# Patient Record
Sex: FEMALE | Race: BLACK OR AFRICAN AMERICAN | Employment: OTHER | URBAN - METROPOLITAN AREA
[De-identification: names, ages, dates, MRNs, and addresses within clinical notes are randomized per-mention and may not be internally consistent; named-entity substitution may affect disease eponyms.]

---

## 2017-02-23 ENCOUNTER — HOSPITAL ENCOUNTER (OUTPATIENT)
Dept: MRI IMAGING | Age: 77
Discharge: HOME | End: 2017-02-23
Attending: ORTHOPAEDIC SURGERY
Payer: MEDICARE

## 2017-02-23 ENCOUNTER — HOSPITAL ENCOUNTER (OUTPATIENT)
Dept: GENERAL RADIOLOGY | Age: 77
Discharge: HOME | End: 2017-02-23
Attending: ORTHOPAEDIC SURGERY
Payer: MEDICARE

## 2017-02-23 DIAGNOSIS — E78.5 HYPERLIPEMIA: ICD-10-CM

## 2017-02-23 DIAGNOSIS — M25.562 LEFT KNEE PAIN: ICD-10-CM

## 2017-02-23 DIAGNOSIS — K21.9 ESOPHAGEAL REFLUX: ICD-10-CM

## 2017-02-23 DIAGNOSIS — M17.12 PRIMARY OSTEOARTHRITIS OF LEFT KNEE: ICD-10-CM

## 2017-02-23 DIAGNOSIS — Z86.718 PERSONAL HISTORY OF VENOUS THROMBOSIS AND EMBOLISM: ICD-10-CM

## 2017-02-23 DIAGNOSIS — E11.52 DIABETIC GANGRENE (HCC): ICD-10-CM

## 2017-02-23 PROCEDURE — 73560 X-RAY EXAM OF KNEE 1 OR 2: CPT

## 2017-02-23 PROCEDURE — 73721 MRI JNT OF LWR EXTRE W/O DYE: CPT

## 2017-02-23 PROCEDURE — 73501 X-RAY EXAM HIP UNI 1 VIEW: CPT

## 2017-02-23 PROCEDURE — 73600 X-RAY EXAM OF ANKLE: CPT

## 2017-03-14 ENCOUNTER — HOSPITAL ENCOUNTER (OUTPATIENT)
Dept: PREADMISSION TESTING | Age: 77
Discharge: HOME | End: 2017-03-14
Payer: MEDICARE

## 2017-03-14 LAB
ANION GAP BLD CALC-SCNC: 3 MMOL/L (ref 3–18)
APPEARANCE UR: CLEAR
ATRIAL RATE: 78 BPM
BACTERIA SPEC CULT: ABNORMAL
BACTERIA URNS QL MICRO: ABNORMAL /HPF
BILIRUB UR QL: NEGATIVE
BUN SERPL-MCNC: 9 MG/DL (ref 7–18)
BUN/CREAT SERPL: 12 (ref 12–20)
CALCIUM SERPL-MCNC: 8.9 MG/DL (ref 8.5–10.1)
CALCULATED P AXIS, ECG09: 12 DEGREES
CALCULATED R AXIS, ECG10: 37 DEGREES
CALCULATED T AXIS, ECG11: 38 DEGREES
CHLORIDE SERPL-SCNC: 106 MMOL/L (ref 100–108)
CO2 SERPL-SCNC: 32 MMOL/L (ref 21–32)
COLOR UR: YELLOW
CREAT SERPL-MCNC: 0.75 MG/DL (ref 0.6–1.3)
DIAGNOSIS, 93000: NORMAL
EPITH CASTS URNS QL MICRO: ABNORMAL /LPF (ref 0–5)
ERYTHROCYTE [DISTWIDTH] IN BLOOD BY AUTOMATED COUNT: 15.9 % (ref 11.6–14.5)
EST. AVERAGE GLUCOSE BLD GHB EST-MCNC: 137 MG/DL
GLUCOSE SERPL-MCNC: 108 MG/DL (ref 74–99)
GLUCOSE UR STRIP.AUTO-MCNC: NEGATIVE MG/DL
HBA1C MFR BLD: 6.4 % (ref 4.5–5.6)
HCT VFR BLD AUTO: 36 % (ref 35–45)
HGB BLD-MCNC: 11.4 G/DL (ref 12–16)
HGB UR QL STRIP: ABNORMAL
KETONES UR QL STRIP.AUTO: NEGATIVE MG/DL
LEUKOCYTE ESTERASE UR QL STRIP.AUTO: NEGATIVE
MCH RBC QN AUTO: 28.4 PG (ref 24–34)
MCHC RBC AUTO-ENTMCNC: 31.7 G/DL (ref 31–37)
MCV RBC AUTO: 89.6 FL (ref 74–97)
NITRITE UR QL STRIP.AUTO: NEGATIVE
P-R INTERVAL, ECG05: 126 MS
PH UR STRIP: 6 [PH] (ref 5–8)
PLATELET # BLD AUTO: 283 K/UL (ref 135–420)
PMV BLD AUTO: 9.1 FL (ref 9.2–11.8)
POTASSIUM SERPL-SCNC: 3.9 MMOL/L (ref 3.5–5.5)
PROT UR STRIP-MCNC: NEGATIVE MG/DL
Q-T INTERVAL, ECG07: 392 MS
QRS DURATION, ECG06: 82 MS
QTC CALCULATION (BEZET), ECG08: 446 MS
RBC # BLD AUTO: 4.02 M/UL (ref 4.2–5.3)
RBC #/AREA URNS HPF: ABNORMAL /HPF (ref 0–5)
SERVICE CMNT-IMP: ABNORMAL
SODIUM SERPL-SCNC: 141 MMOL/L (ref 136–145)
SP GR UR REFRACTOMETRY: 1.02 (ref 1–1.03)
UROBILINOGEN UR QL STRIP.AUTO: 0.2 EU/DL (ref 0.2–1)
VENTRICULAR RATE, ECG03: 78 BPM
WBC # BLD AUTO: 6.9 K/UL (ref 4.6–13.2)
WBC URNS QL MICRO: ABNORMAL /HPF (ref 0–5)

## 2017-03-14 PROCEDURE — 87086 URINE CULTURE/COLONY COUNT: CPT | Performed by: ORTHOPAEDIC SURGERY

## 2017-03-14 PROCEDURE — 36415 COLL VENOUS BLD VENIPUNCTURE: CPT | Performed by: ORTHOPAEDIC SURGERY

## 2017-03-14 PROCEDURE — 87641 MR-STAPH DNA AMP PROBE: CPT | Performed by: ORTHOPAEDIC SURGERY

## 2017-03-14 PROCEDURE — 81001 URINALYSIS AUTO W/SCOPE: CPT | Performed by: ORTHOPAEDIC SURGERY

## 2017-03-14 PROCEDURE — 85027 COMPLETE CBC AUTOMATED: CPT | Performed by: ORTHOPAEDIC SURGERY

## 2017-03-14 PROCEDURE — 80048 BASIC METABOLIC PNL TOTAL CA: CPT | Performed by: ORTHOPAEDIC SURGERY

## 2017-03-14 PROCEDURE — 93005 ELECTROCARDIOGRAM TRACING: CPT

## 2017-03-14 PROCEDURE — 83036 HEMOGLOBIN GLYCOSYLATED A1C: CPT | Performed by: ORTHOPAEDIC SURGERY

## 2017-03-15 PROBLEM — Z22.322 MRSA NASAL COLONIZATION: Status: ACTIVE | Noted: 2017-03-15

## 2017-03-16 LAB
BACTERIA SPEC CULT: NORMAL
SERVICE CMNT-IMP: NORMAL

## 2017-03-22 PROBLEM — M17.12 OSTEOARTHRITIS OF LEFT KNEE: Status: ACTIVE | Noted: 2017-03-22

## 2017-03-24 NOTE — H&P
Patient Name:  Darlin Wright  YOB: 1940      Chief Complaint:   Left knee osteoarthritis and left-sided hip pain. History of Chief Complaint:  Ms. Rebecca Vivar is a 78 y.o female being seen for left knee osteoarthritis and left-sided hip pain. She is planning on having the left knee replaced but is having difficulty with walking and standing because of left-sided buttock pain and hip pain. She has no numbness, no weakness, and no bowel or bladder dysfunction. Past Medical/Surgical History:    Disease/Disorder Type Date Side Surgery Date Side Comment   Phlebitis          Pneumonia          Gout          Fibromyalgia          Degenerative joint disease          Arthritis          Hyperlipidemia          Hypertension          Blood Clots              Hysterectomy 1971         Spinal fusion, lumbar 2000         Spinal fusion, lumbar 2005         Arthroscopy shoulder 2008 left        Knee surgery 2008 right        Knee surgery 2010 right        Arthroscopy knee 2011 right        Heel surgery 2012 left        Foot surgery 2012 right        Hip replacement 2013 right      Allergies:    Ingredient Reaction Medication Name Comment   CODEINE      PENICILLINS          Current Medications:    Medication Directions   Coumadin 5 mg Tab    hydrochlorothiazide 12.5 mg Cap    Citracal + D    Nexium 40 mg Cap    Lipitor 40 mg Tab    Fosamax Plus D 70 mg-5,600 unit Tab    Ditropan XL 10 mg 24 hr Tab    potassium chloride ER 20 mEq Tab, Particles/Crystals    Percocet      Social History:      ALCOHOL   consumed rarely.     Family History:    Disease Detail Family Member Age Cause of Death Comments   Family history of Cancer   N    Family history of Diabetes mellitus   N    Family history of Heart disease   N    Family history of Hypertension   N    Family history of Osteoarthritis   N    Arthritis Father  N    Hypertension Father  N    Stroke Father  N    Cancer, unknown Mother  N    Hypertension Mother  N Review of Systems:  A full review of systems was completed. Pertinent positives include headache, blood in urine, changes in mood, chronic cough, depression, frequent urination, indigestion, joint pain, joint stiffness, loss of balance and swelling of feet. Pertinent negatives include blurred vision, chest pain, chills, cold, discharge of the eyes, dizziness, double vision, fever, hearing loss, heart murmur, itching of the eyes, palpitations, redness of the eyes, rheumatic fever, ringing in ears, sore throat/hoarseness, weight change, abdominal pain, anxiety, bipolar disorder, bladder/kidney infection, bloody stool, burning sensation, diarrhea, difficulty breathing, difficulty swallowing, fainting, fracture/dislocation, gas/bloating, gout, hemorrhoids, incontinence, memory loss, muscle weakness, nausea/vomiting, numbness/tingling, pain on breathing, painful urination, psoriasis, rash/itching, Raynaud's phenomenon, rheumatoid disease, seizure disorder, shortness of breath, sprain/strain, tendonitis, varicose veins and wheezing. Physical Examination:    General:  The patient appears healthy. Cardiovascular:  Arterial pulses are normal.  Heart- RRR  Lungs-CTA karla  Abdomen- +BS,soft,nontender  Skin:  The skin is normal appearing with no contusions or wounds noted. Musculoskeletal:  There is tenderness of the left PSIS and left greater trochanter. The thoracolumbar spine has normal kyphosis, a normal appearance, and no scoliosis. There is full range of motion of the cervical, thoracic, and lumbar spine and of the hips, knees, and ankles. Straight leg raising and crossed straight leg raising tests are negative. Neurological:  There is no weakness in the thoracic, lumbar, or sacral spine or in the lower extremities or hips. Deep tendon reflexes are present and normal bilaterally. Ankle and knee jerks are normal with no clonus.         Radiograph Examination:  Review of her x-ray examination reveals severe medial osteoarthritis of the left knee. Impression:  Ms. Liliana Kelley has left-sided hip pain, bursitis, and tendinitis. She will follow up for her regularly scheduled surgical intervention for left total knee arthroplasty.

## 2017-04-03 ENCOUNTER — ANESTHESIA EVENT (OUTPATIENT)
Dept: SURGERY | Age: 77
DRG: 469 | End: 2017-04-03
Payer: MEDICARE

## 2017-04-04 ENCOUNTER — HOSPITAL ENCOUNTER (INPATIENT)
Age: 77
LOS: 4 days | Discharge: NURSING, REHAB OR LONG-TERM CARE | DRG: 469 | End: 2017-04-08
Attending: ORTHOPAEDIC SURGERY | Admitting: ORTHOPAEDIC SURGERY
Payer: MEDICARE

## 2017-04-04 ENCOUNTER — ANESTHESIA (OUTPATIENT)
Dept: SURGERY | Age: 77
DRG: 469 | End: 2017-04-04
Payer: MEDICARE

## 2017-04-04 ENCOUNTER — SURGERY (OUTPATIENT)
Age: 77
End: 2017-04-04

## 2017-04-04 PROBLEM — I25.10 CAD (CORONARY ARTERY DISEASE): Status: ACTIVE | Noted: 2017-04-04

## 2017-04-04 LAB
GLUCOSE BLD STRIP.AUTO-MCNC: 77 MG/DL (ref 70–110)
INR PPP: 1 (ref 0.8–1.2)
PROTHROMBIN TIME: 13.1 SEC (ref 11.5–15.2)

## 2017-04-04 PROCEDURE — L1830 KO IMMOB CANVAS LONG PRE OTS: HCPCS | Performed by: ORTHOPAEDIC SURGERY

## 2017-04-04 PROCEDURE — C1713 ANCHOR/SCREW BN/BN,TIS/BN: HCPCS | Performed by: ORTHOPAEDIC SURGERY

## 2017-04-04 PROCEDURE — 97530 THERAPEUTIC ACTIVITIES: CPT

## 2017-04-04 PROCEDURE — 85610 PROTHROMBIN TIME: CPT | Performed by: ORTHOPAEDIC SURGERY

## 2017-04-04 PROCEDURE — 77030018846 HC SOL IRR STRL H20 ICUM -A: Performed by: ORTHOPAEDIC SURGERY

## 2017-04-04 PROCEDURE — 74011250637 HC RX REV CODE- 250/637: Performed by: PHYSICIAN ASSISTANT

## 2017-04-04 PROCEDURE — 77030020782 HC GWN BAIR PAWS FLX 3M -B: Performed by: ORTHOPAEDIC SURGERY

## 2017-04-04 PROCEDURE — C1776 JOINT DEVICE (IMPLANTABLE): HCPCS | Performed by: ORTHOPAEDIC SURGERY

## 2017-04-04 PROCEDURE — 77030018883 HC BLD SAW SAG4 STRY -B: Performed by: ORTHOPAEDIC SURGERY

## 2017-04-04 PROCEDURE — 74011250637 HC RX REV CODE- 250/637: Performed by: ANESTHESIOLOGY

## 2017-04-04 PROCEDURE — 74011250636 HC RX REV CODE- 250/636

## 2017-04-04 PROCEDURE — 74011000250 HC RX REV CODE- 250

## 2017-04-04 PROCEDURE — 77030020813 HC INST SCULP CEM KT DISP S&N -B: Performed by: ORTHOPAEDIC SURGERY

## 2017-04-04 PROCEDURE — 65270000029 HC RM PRIVATE

## 2017-04-04 PROCEDURE — 74011000250 HC RX REV CODE- 250: Performed by: PHYSICIAN ASSISTANT

## 2017-04-04 PROCEDURE — 77010033678 HC OXYGEN DAILY

## 2017-04-04 PROCEDURE — 74011250636 HC RX REV CODE- 250/636: Performed by: ANESTHESIOLOGY

## 2017-04-04 PROCEDURE — 97161 PT EVAL LOW COMPLEX 20 MIN: CPT

## 2017-04-04 PROCEDURE — 36415 COLL VENOUS BLD VENIPUNCTURE: CPT | Performed by: ORTHOPAEDIC SURGERY

## 2017-04-04 PROCEDURE — 74011000250 HC RX REV CODE- 250: Performed by: ANESTHESIOLOGY

## 2017-04-04 PROCEDURE — 77030028925 HC PIN/DRL SET HUM S&N -C: Performed by: ORTHOPAEDIC SURGERY

## 2017-04-04 PROCEDURE — 82962 GLUCOSE BLOOD TEST: CPT

## 2017-04-04 PROCEDURE — 77030010785: Performed by: ORTHOPAEDIC SURGERY

## 2017-04-04 PROCEDURE — 74011258636 HC RX REV CODE- 258/636: Performed by: PHYSICIAN ASSISTANT

## 2017-04-04 PROCEDURE — 77030020754 HC CUF TRNQT 2BLA STRY -B: Performed by: ORTHOPAEDIC SURGERY

## 2017-04-04 PROCEDURE — 76942 ECHO GUIDE FOR BIOPSY: CPT | Performed by: ORTHOPAEDIC SURGERY

## 2017-04-04 PROCEDURE — 77030011640 HC PAD GRND REM COVD -A: Performed by: ORTHOPAEDIC SURGERY

## 2017-04-04 PROCEDURE — 77030008462 HC STPLR SKN PROX J&J -A: Performed by: ORTHOPAEDIC SURGERY

## 2017-04-04 PROCEDURE — 86900 BLOOD TYPING SEROLOGIC ABO: CPT | Performed by: ORTHOPAEDIC SURGERY

## 2017-04-04 PROCEDURE — 77030027138 HC INCENT SPIROMETER -A: Performed by: ORTHOPAEDIC SURGERY

## 2017-04-04 PROCEDURE — 77030012406 HC DRN WND PENRS BARD -A: Performed by: ORTHOPAEDIC SURGERY

## 2017-04-04 PROCEDURE — 76210000006 HC OR PH I REC 0.5 TO 1 HR: Performed by: ORTHOPAEDIC SURGERY

## 2017-04-04 PROCEDURE — 0SRD0J9 REPLACEMENT OF LEFT KNEE JOINT WITH SYNTHETIC SUBSTITUTE, CEMENTED, OPEN APPROACH: ICD-10-PCS | Performed by: ORTHOPAEDIC SURGERY

## 2017-04-04 PROCEDURE — 77030032489 HC SLV COMPR SCD FT CUF COVD -B: Performed by: ORTHOPAEDIC SURGERY

## 2017-04-04 PROCEDURE — 74011000250 HC RX REV CODE- 250: Performed by: ORTHOPAEDIC SURGERY

## 2017-04-04 PROCEDURE — 77030005402 HC CATH RAD ART LN KT TELE -B: Performed by: ANESTHESIOLOGY

## 2017-04-04 PROCEDURE — 74011250637 HC RX REV CODE- 250/637: Performed by: ORTHOPAEDIC SURGERY

## 2017-04-04 PROCEDURE — 76060000033 HC ANESTHESIA 1 TO 1.5 HR: Performed by: ORTHOPAEDIC SURGERY

## 2017-04-04 PROCEDURE — 77030013287 HC BLK NRV KT TELE -B: Performed by: ANESTHESIOLOGY

## 2017-04-04 PROCEDURE — 74011250636 HC RX REV CODE- 250/636: Performed by: PHYSICIAN ASSISTANT

## 2017-04-04 PROCEDURE — 76010000149 HC OR TIME 1 TO 1.5 HR: Performed by: ORTHOPAEDIC SURGERY

## 2017-04-04 PROCEDURE — 86920 COMPATIBILITY TEST SPIN: CPT | Performed by: ORTHOPAEDIC SURGERY

## 2017-04-04 PROCEDURE — 74011250636 HC RX REV CODE- 250/636: Performed by: ORTHOPAEDIC SURGERY

## 2017-04-04 PROCEDURE — 77030030129 HC BLK CUST CUT VIS S&N -F: Performed by: ORTHOPAEDIC SURGERY

## 2017-04-04 PROCEDURE — 64448 NJX AA&/STRD FEM NRV NFS IMG: CPT | Performed by: ANESTHESIOLOGY

## 2017-04-04 PROCEDURE — 77030018836 HC SOL IRR NACL ICUM -A: Performed by: ORTHOPAEDIC SURGERY

## 2017-04-04 PROCEDURE — 77030027355 HC HNDPC IRR SURGLAV STRY -B: Performed by: ORTHOPAEDIC SURGERY

## 2017-04-04 PROCEDURE — 77030003028 HC SUT VCRL J&J -A: Performed by: ORTHOPAEDIC SURGERY

## 2017-04-04 PROCEDURE — 77030012508 HC MSK AIRWY LMA AMBU -A: Performed by: ANESTHESIOLOGY

## 2017-04-04 DEVICE — SYSTEM TKR OXINIUM XLPE KNEE CAPITATED VERILAST: Type: IMPLANTABLE DEVICE | Site: KNEE | Status: FUNCTIONAL

## 2017-04-04 DEVICE — LEGION HIGHLY CROSS LINKED                                    POLYETHYLENE DISHED INSERT SIZE 5-6 9MM
Type: IMPLANTABLE DEVICE | Site: KNEE | Status: FUNCTIONAL
Brand: LEGION

## 2017-04-04 DEVICE — GENESIS II OXINIUM FEMORAL SIZE 4 LEFT
Type: IMPLANTABLE DEVICE | Site: KNEE | Status: FUNCTIONAL
Brand: GENESIS II

## 2017-04-04 DEVICE — CEMENT BNE 40GM FULL DOSE PMMA W/ GENT M VISC RADPQ FAST: Type: IMPLANTABLE DEVICE | Site: KNEE | Status: FUNCTIONAL

## 2017-04-04 DEVICE — GENESIS II RESURFACING PATELLAR                                    PROSTHESIS  32MM
Type: IMPLANTABLE DEVICE | Site: KNEE | Status: FUNCTIONAL
Brand: GENESIS II

## 2017-04-04 DEVICE — GENESIS II NON-POROUS TIBIAL                                    BASEPLATE SIZE 5 LEFT
Type: IMPLANTABLE DEVICE | Site: KNEE | Status: FUNCTIONAL
Brand: GENESIS II

## 2017-04-04 RX ORDER — PANTOPRAZOLE SODIUM 40 MG/1
40 TABLET, DELAYED RELEASE ORAL DAILY
Status: DISCONTINUED | OUTPATIENT
Start: 2017-04-05 | End: 2017-04-08 | Stop reason: HOSPADM

## 2017-04-04 RX ORDER — LIDOCAINE HYDROCHLORIDE 20 MG/ML
INJECTION, SOLUTION EPIDURAL; INFILTRATION; INTRACAUDAL; PERINEURAL AS NEEDED
Status: DISCONTINUED | OUTPATIENT
Start: 2017-04-04 | End: 2017-04-04 | Stop reason: HOSPADM

## 2017-04-04 RX ORDER — SODIUM CHLORIDE, SODIUM LACTATE, POTASSIUM CHLORIDE, CALCIUM CHLORIDE 600; 310; 30; 20 MG/100ML; MG/100ML; MG/100ML; MG/100ML
125 INJECTION, SOLUTION INTRAVENOUS CONTINUOUS
Status: DISCONTINUED | OUTPATIENT
Start: 2017-04-04 | End: 2017-04-06

## 2017-04-04 RX ORDER — LANOLIN ALCOHOL/MO/W.PET/CERES
1 CREAM (GRAM) TOPICAL 2 TIMES DAILY WITH MEALS
Status: DISCONTINUED | OUTPATIENT
Start: 2017-04-04 | End: 2017-04-08 | Stop reason: HOSPADM

## 2017-04-04 RX ORDER — PROPOFOL 10 MG/ML
INJECTION, EMULSION INTRAVENOUS AS NEEDED
Status: DISCONTINUED | OUTPATIENT
Start: 2017-04-04 | End: 2017-04-04 | Stop reason: HOSPADM

## 2017-04-04 RX ORDER — POTASSIUM CHLORIDE 20 MEQ/1
20 TABLET, EXTENDED RELEASE ORAL 2 TIMES DAILY
Status: DISCONTINUED | OUTPATIENT
Start: 2017-04-04 | End: 2017-04-08 | Stop reason: HOSPADM

## 2017-04-04 RX ORDER — WARFARIN SODIUM 5 MG/1
5 TABLET ORAL ONCE
Status: COMPLETED | OUTPATIENT
Start: 2017-04-04 | End: 2017-04-04

## 2017-04-04 RX ORDER — ACETAMINOPHEN 500 MG
1000 TABLET ORAL
COMMUNITY
End: 2017-04-08

## 2017-04-04 RX ORDER — SODIUM CHLORIDE, SODIUM LACTATE, POTASSIUM CHLORIDE, CALCIUM CHLORIDE 600; 310; 30; 20 MG/100ML; MG/100ML; MG/100ML; MG/100ML
125 INJECTION, SOLUTION INTRAVENOUS CONTINUOUS
Status: DISCONTINUED | OUTPATIENT
Start: 2017-04-04 | End: 2017-04-04 | Stop reason: HOSPADM

## 2017-04-04 RX ORDER — KETOROLAC TROMETHAMINE 15 MG/ML
15 INJECTION, SOLUTION INTRAMUSCULAR; INTRAVENOUS
Status: DISPENSED | OUTPATIENT
Start: 2017-04-04 | End: 2017-04-05

## 2017-04-04 RX ORDER — CYCLOSPORINE 0.5 MG/ML
1 EMULSION OPHTHALMIC 2 TIMES DAILY
Status: DISCONTINUED | OUTPATIENT
Start: 2017-04-04 | End: 2017-04-08 | Stop reason: HOSPADM

## 2017-04-04 RX ORDER — WARFARIN SODIUM 5 MG/1
5 TABLET ORAL
Status: DISCONTINUED | OUTPATIENT
Start: 2017-04-05 | End: 2017-04-04 | Stop reason: DRUGHIGH

## 2017-04-04 RX ORDER — HYDROMORPHONE HYDROCHLORIDE 4 MG/1
4 TABLET ORAL
Status: DISCONTINUED | OUTPATIENT
Start: 2017-04-04 | End: 2017-04-08 | Stop reason: HOSPADM

## 2017-04-04 RX ORDER — NALOXONE HYDROCHLORIDE 0.4 MG/ML
0.4 INJECTION, SOLUTION INTRAMUSCULAR; INTRAVENOUS; SUBCUTANEOUS AS NEEDED
Status: DISCONTINUED | OUTPATIENT
Start: 2017-04-04 | End: 2017-04-08 | Stop reason: HOSPADM

## 2017-04-04 RX ORDER — PREGABALIN 100 MG/1
100 CAPSULE ORAL
Status: COMPLETED | OUTPATIENT
Start: 2017-04-04 | End: 2017-04-04

## 2017-04-04 RX ORDER — FENTANYL CITRATE 50 UG/ML
50 INJECTION, SOLUTION INTRAMUSCULAR; INTRAVENOUS
Status: DISCONTINUED | OUTPATIENT
Start: 2017-04-04 | End: 2017-04-04 | Stop reason: HOSPADM

## 2017-04-04 RX ORDER — SODIUM CHLORIDE 0.9 % (FLUSH) 0.9 %
5-10 SYRINGE (ML) INJECTION AS NEEDED
Status: DISCONTINUED | OUTPATIENT
Start: 2017-04-04 | End: 2017-04-08 | Stop reason: HOSPADM

## 2017-04-04 RX ORDER — DIPHENHYDRAMINE HYDROCHLORIDE 50 MG/ML
12.5 INJECTION, SOLUTION INTRAMUSCULAR; INTRAVENOUS
Status: DISCONTINUED | OUTPATIENT
Start: 2017-04-04 | End: 2017-04-08 | Stop reason: HOSPADM

## 2017-04-04 RX ORDER — FENTANYL CITRATE 50 UG/ML
INJECTION, SOLUTION INTRAMUSCULAR; INTRAVENOUS AS NEEDED
Status: DISCONTINUED | OUTPATIENT
Start: 2017-04-04 | End: 2017-04-04

## 2017-04-04 RX ORDER — DEXTROSE, SODIUM CHLORIDE, SODIUM LACTATE, POTASSIUM CHLORIDE, AND CALCIUM CHLORIDE 5; .6; .31; .03; .02 G/100ML; G/100ML; G/100ML; G/100ML; G/100ML
125 INJECTION, SOLUTION INTRAVENOUS CONTINUOUS
Status: DISCONTINUED | OUTPATIENT
Start: 2017-04-04 | End: 2017-04-06

## 2017-04-04 RX ORDER — SODIUM CHLORIDE 0.9 % (FLUSH) 0.9 %
5-10 SYRINGE (ML) INJECTION EVERY 8 HOURS
Status: DISCONTINUED | OUTPATIENT
Start: 2017-04-04 | End: 2017-04-08 | Stop reason: HOSPADM

## 2017-04-04 RX ORDER — WARFARIN 2.5 MG/1
2.5 TABLET ORAL
Status: DISCONTINUED | OUTPATIENT
Start: 2017-04-11 | End: 2017-04-04 | Stop reason: DRUGHIGH

## 2017-04-04 RX ORDER — MIDAZOLAM HYDROCHLORIDE 1 MG/ML
INJECTION, SOLUTION INTRAMUSCULAR; INTRAVENOUS AS NEEDED
Status: DISCONTINUED | OUTPATIENT
Start: 2017-04-04 | End: 2017-04-04 | Stop reason: HOSPADM

## 2017-04-04 RX ORDER — HYDROCHLOROTHIAZIDE 25 MG/1
12.5 TABLET ORAL DAILY
Status: DISCONTINUED | OUTPATIENT
Start: 2017-04-05 | End: 2017-04-08 | Stop reason: HOSPADM

## 2017-04-04 RX ORDER — SUCRALFATE 1 G/10ML
1 SUSPENSION ORAL 2 TIMES DAILY
Status: DISCONTINUED | OUTPATIENT
Start: 2017-04-04 | End: 2017-04-08 | Stop reason: HOSPADM

## 2017-04-04 RX ORDER — LANOLIN ALCOHOL/MO/W.PET/CERES
400 CREAM (GRAM) TOPICAL 2 TIMES DAILY
Status: DISCONTINUED | OUTPATIENT
Start: 2017-04-04 | End: 2017-04-08 | Stop reason: HOSPADM

## 2017-04-04 RX ORDER — CELECOXIB 100 MG/1
400 CAPSULE ORAL
Status: COMPLETED | OUTPATIENT
Start: 2017-04-04 | End: 2017-04-04

## 2017-04-04 RX ORDER — METOCLOPRAMIDE HYDROCHLORIDE 5 MG/ML
INJECTION INTRAMUSCULAR; INTRAVENOUS AS NEEDED
Status: DISCONTINUED | OUTPATIENT
Start: 2017-04-04 | End: 2017-04-04 | Stop reason: HOSPADM

## 2017-04-04 RX ORDER — FENTANYL CITRATE 50 UG/ML
INJECTION, SOLUTION INTRAMUSCULAR; INTRAVENOUS AS NEEDED
Status: DISCONTINUED | OUTPATIENT
Start: 2017-04-04 | End: 2017-04-04 | Stop reason: HOSPADM

## 2017-04-04 RX ORDER — DOCUSATE SODIUM 100 MG/1
100 CAPSULE, LIQUID FILLED ORAL 2 TIMES DAILY
Status: DISCONTINUED | OUTPATIENT
Start: 2017-04-04 | End: 2017-04-08 | Stop reason: HOSPADM

## 2017-04-04 RX ORDER — ENOXAPARIN SODIUM 100 MG/ML
30 INJECTION SUBCUTANEOUS EVERY 12 HOURS
Status: DISCONTINUED | OUTPATIENT
Start: 2017-04-05 | End: 2017-04-05

## 2017-04-04 RX ORDER — ONDANSETRON 4 MG/1
4 TABLET, ORALLY DISINTEGRATING ORAL
Status: DISCONTINUED | OUTPATIENT
Start: 2017-04-04 | End: 2017-04-08 | Stop reason: HOSPADM

## 2017-04-04 RX ORDER — SODIUM CHLORIDE 0.9 % (FLUSH) 0.9 %
5-10 SYRINGE (ML) INJECTION AS NEEDED
Status: DISCONTINUED | OUTPATIENT
Start: 2017-04-04 | End: 2017-04-04 | Stop reason: HOSPADM

## 2017-04-04 RX ORDER — HYDROMORPHONE HYDROCHLORIDE 2 MG/1
2 TABLET ORAL
Status: DISCONTINUED | OUTPATIENT
Start: 2017-04-04 | End: 2017-04-08 | Stop reason: HOSPADM

## 2017-04-04 RX ORDER — ONDANSETRON 2 MG/ML
INJECTION INTRAMUSCULAR; INTRAVENOUS AS NEEDED
Status: DISCONTINUED | OUTPATIENT
Start: 2017-04-04 | End: 2017-04-04 | Stop reason: HOSPADM

## 2017-04-04 RX ORDER — ACETAMINOPHEN 10 MG/ML
1000 INJECTION, SOLUTION INTRAVENOUS ONCE
Status: COMPLETED | OUTPATIENT
Start: 2017-04-04 | End: 2017-04-04

## 2017-04-04 RX ADMIN — PREGABALIN 100 MG: 100 CAPSULE ORAL at 11:59

## 2017-04-04 RX ADMIN — CELECOXIB 400 MG: 100 CAPSULE ORAL at 11:59

## 2017-04-04 RX ADMIN — FERROUS SULFATE TAB 325 MG (65 MG ELEMENTAL FE) 325 MG: 325 (65 FE) TAB at 19:40

## 2017-04-04 RX ADMIN — SUCRALFATE 1 G: 1 SUSPENSION ORAL at 20:50

## 2017-04-04 RX ADMIN — FENTANYL CITRATE 50 MCG: 50 INJECTION, SOLUTION INTRAMUSCULAR; INTRAVENOUS at 12:42

## 2017-04-04 RX ADMIN — POTASSIUM CHLORIDE 20 MEQ: 20 TABLET, EXTENDED RELEASE ORAL at 20:50

## 2017-04-04 RX ADMIN — LIDOCAINE HYDROCHLORIDE 40 MG: 20 INJECTION, SOLUTION EPIDURAL; INFILTRATION; INTRACAUDAL; PERINEURAL at 14:23

## 2017-04-04 RX ADMIN — POLYMYXIN B SULFATE: 500000 INJECTION, POWDER, LYOPHILIZED, FOR SOLUTION INTRAMUSCULAR; INTRATHECAL; INTRAVENOUS; OPHTHALMIC at 14:44

## 2017-04-04 RX ADMIN — FENTANYL CITRATE 25 MCG: 50 INJECTION, SOLUTION INTRAMUSCULAR; INTRAVENOUS at 14:48

## 2017-04-04 RX ADMIN — SODIUM CHLORIDE, SODIUM LACTATE, POTASSIUM CHLORIDE, CALCIUM CHLORIDE, AND DEXTROSE MONOHYDRATE 125 ML/HR: 600; 310; 30; 20; 5 INJECTION, SOLUTION INTRAVENOUS at 17:58

## 2017-04-04 RX ADMIN — FENTANYL CITRATE 25 MCG: 50 INJECTION, SOLUTION INTRAMUSCULAR; INTRAVENOUS at 15:24

## 2017-04-04 RX ADMIN — SODIUM CHLORIDE, SODIUM LACTATE, POTASSIUM CHLORIDE, AND CALCIUM CHLORIDE 125 ML/HR: 600; 310; 30; 20 INJECTION, SOLUTION INTRAVENOUS at 11:14

## 2017-04-04 RX ADMIN — KETOROLAC TROMETHAMINE 15 MG: 15 INJECTION, SOLUTION INTRAMUSCULAR; INTRAVENOUS at 20:51

## 2017-04-04 RX ADMIN — ACETAMINOPHEN 1000 MG: 10 INJECTION, SOLUTION INTRAVENOUS at 14:18

## 2017-04-04 RX ADMIN — ONDANSETRON 4 MG: 2 INJECTION INTRAMUSCULAR; INTRAVENOUS at 14:18

## 2017-04-04 RX ADMIN — FENTANYL CITRATE 25 MCG: 50 INJECTION, SOLUTION INTRAMUSCULAR; INTRAVENOUS at 14:58

## 2017-04-04 RX ADMIN — PROPOFOL 140 MG: 10 INJECTION, EMULSION INTRAVENOUS at 14:23

## 2017-04-04 RX ADMIN — FENTANYL CITRATE 25 MCG: 50 INJECTION, SOLUTION INTRAMUSCULAR; INTRAVENOUS at 14:23

## 2017-04-04 RX ADMIN — METOCLOPRAMIDE HYDROCHLORIDE 10 MG: 5 INJECTION INTRAMUSCULAR; INTRAVENOUS at 14:18

## 2017-04-04 RX ADMIN — Medication 400 MG: at 20:50

## 2017-04-04 RX ADMIN — VANCOMYCIN HYDROCHLORIDE 1250 MG: 10 INJECTION, POWDER, LYOPHILIZED, FOR SOLUTION INTRAVENOUS at 14:09

## 2017-04-04 RX ADMIN — CYCLOSPORINE 1 DROP: 0.5 EMULSION OPHTHALMIC at 20:50

## 2017-04-04 RX ADMIN — WARFARIN SODIUM 5 MG: 5 TABLET ORAL at 19:39

## 2017-04-04 RX ADMIN — SODIUM CHLORIDE, SODIUM LACTATE, POTASSIUM CHLORIDE, AND CALCIUM CHLORIDE 125 ML/HR: 600; 310; 30; 20 INJECTION, SOLUTION INTRAVENOUS at 14:12

## 2017-04-04 RX ADMIN — MIDAZOLAM HYDROCHLORIDE 1 MG: 1 INJECTION, SOLUTION INTRAMUSCULAR; INTRAVENOUS at 12:45

## 2017-04-04 RX ADMIN — MIDAZOLAM HYDROCHLORIDE 1 MG: 1 INJECTION, SOLUTION INTRAMUSCULAR; INTRAVENOUS at 12:42

## 2017-04-04 RX ADMIN — BUPIVACAINE HYDROCHLORIDE 6 ML/HR: 7.5 INJECTION, SOLUTION EPIDURAL; RETROBULBAR at 16:07

## 2017-04-04 NOTE — PERIOP NOTES
TRANSFER - OUT REPORT:    Verbal report given to Tammi RN (name) on Marybeth Resendez  being transferred to 2 S (unit) for routine progression of care       Report consisted of patients Situation, Background, Assessment and   Recommendations(SBAR). Information from the following report(s) SBAR, Kardex, Procedure Summary and Intake/Output was reviewed with the receiving nurse. Lines:   Peripheral IV 08/06/13 Right Antecubital (Active)       Peripheral IV 04/04/17 Left Hand (Active)   Site Assessment Clean, dry, & intact 4/4/2017  3:40 PM   Phlebitis Assessment 0 4/4/2017  3:40 PM   Infiltration Assessment 0 4/4/2017  3:40 PM   Dressing Status Clean, dry, & intact 4/4/2017  3:40 PM   Dressing Type Transparent;Tape 4/4/2017  3:40 PM   Hub Color/Line Status Infusing 4/4/2017  3:40 PM       Subcutaneous Infusion Line 04/04/17 Left Femoral (Active)   Site Assessment Clean, dry, & intact 4/4/2017  1:00 PM   Dressing Status Clean, dry, & intact;New;Occlusive 4/4/2017  1:00 PM   Dressing Type Tape;Transparent 4/4/2017  1:00 PM   Hub Color/Line Status Capped 4/4/2017  1:00 PM        Opportunity for questions and clarification was provided.       Patient transported with:   O2 @ 2 liters  Registered Nurse

## 2017-04-04 NOTE — PROGRESS NOTES
Pharmacy Dosing Services:  Warfarin    Consult for Warfarin Dosing by Pharmacy by Dr. Renny Levin provided for this 78 y.o.  female , for indication of Orthopedic Surgery (VTE prophylaxis), Hx of PE. Dose to achieve an INR goal of 2-3    Order entered for  Warfarin  5 (mg) ordered to be given today at 18:00. ( Patient home regimen:  Warfarin 5 mg po M,W,Th,F,Sa,Rojas   2.5 mg Tues )    Patient also on Lovenox 30 mg sq q12hrs x 6 doses. LABS    PT/INR Lab Results   Component Value Date/Time    INR 1.0 04/04/2017 10:30 AM      Platelets Lab Results   Component Value Date/Time    PLATELET 191 97/66/3879 03:08 PM      H/H     Lab Results   Component Value Date/Time    HGB 11.4 03/14/2017 03:08 PM        Warfarin Administrations (last 168 hours)     None          Pharmacy to follow daily and will provide subsequent Warfarin dosing based on clinical status.   Vladimir Howard, [facility]  Contact information   [phone]

## 2017-04-04 NOTE — PROGRESS NOTES
Received client from PACU in satisfactory condition. Client is a pt of Dr. Cheli Celis. Pt had a left Total Knee Replacement today. Client is A/O X 4. Client is calm and cooperative. Clients PERRLA. denies numbness or tingling to extremeties. Palpable Posterior Tibial and Dorsalis Pedis pulses. Capillary Refill less than 3 seconds. Skin is warm , dry and skin color is appropriate to race. Client is negative for JVD. Bibasilar breath sounds clear. No use of accessory muscles. Bowel sounds active to all quadrants. Abdomen is soft and non-tender. Client has  voided post-operatively. No bladder distention evident. No complaints of bladder discomfort. Client has a Ace dressing to the left knee. Dressing is CDI. No other skin integrity issues present. Mario Alberto hose applied. Sequential compression device applied. Client has 18 gauge PIV present and running LR. PIV started preoperatively. Client had On Q pump running at 6 ml/hr. Clients pain is 6/10 on 0-10 scale. Client oriented to call bell use as well as bed use. Client oriented to phone and how to order meals. Call bell within reach. Bed in low position. Three side rails up.    2051 - Pt state pain as 8/10. Pt received medication per MAR and ice. Pt informed of the side effects of the medication and the next time medication can be given. Pt encouraged to call for assistance and to manage pain. Pt encouraged to use incentive spirometer. Pt left with call light within reach, bed in low position and wheels locked. Will continue to monitor. Shift summary - Pt pain managed with prn medication per MAR. Pt informed about all medications and side effects and encouraged to ask questions about medication. Pt encouraged throughout the night to use incentive spirometer and the purpose of the incentive spirometer. Pt left with no signs of distress and any concerns of pt addressed.

## 2017-04-04 NOTE — OP NOTES
OPERATIVE NOTE    Patient: Dustin Campos MRN: [id number]  SSN: [id number]    YOB: 1940  Age: 78 y.o. Sex: female      Indications: This is a 78y.o. year-old female who presents with left knee pain. X-rays have revealed significant OA. The patient was admitted for surgery as conservative measures have failed. Date of Procedure: 4/4/2017     Preoperative Diagnosis: OSTEOARTHRITIS LEFT KNEE,DIABETIC TYPE II,ELEVATED CHOLESTEROL,REFLUX,HX OF DVT    Postoperative Diagnosis: OSTEOARTHRITIS LEFT KNEE,DIABETIC TYPE II,ELEVATED CHOLESTEROL,REFLUX,HX OF DVT      Procedure: Procedure(s):  LEFT TOTAL KNEE ARTHROPLASTY - SPEC POP, **CONTACT ISOLATION**    Surgeon(s) and Role:     * Devonte Kirkland MD - Primary    Anesthesia: @ORANEST    Estimated Blood Loss: 50cc    Tourniquet Time: 45min    Specimens: * No specimens in log *     Implants:   Implant Name Type Inv. Item Serial No.  Lot No. LRB No. Used Action   CEMENT BNE MED VISCO 40GM --  - [id number]  CEMENT BNE MED VISCO 40GM --   [employer] [id number] Left 2 Implanted   PAT BCNVX UHMWPE 32MM -- EMILY II - [id number]  PAT BCNVX UHMWPE 32MM -- EMILY II  [employer] [id number] Left 1 Implanted   INSERT LGN XLPE [facility] SZ5-6 9MM --  - [id number]  INSERT LGN XLPE DS SZ5-6 9MM --   [employer] [id number] Left 1 Implanted   BASEPLT FEM ESSENCE NP 5 LT -- EMILY II - [id number]  BASEPLT FEM ESSENCE NP 5 LT -- EMILY II  [employer] [id number] Left 1 Implanted   FEM OXIN NP EMILY II 4 LT --  - [id number]   FEM OXIN NP EMILY II 4 LT --    [employer] [id number] Left 1 Implanted       Complications: None; patient tolerated the procedure well. Procedure: The patient was greeted by Anesthesia and taken to the operative suite, where the patient underwent general endotracheal anesthesia. Tourniquet was placed on the upper thigh.   The leg was sterilely prepped and draped in a standard fashion. The leg was exsanguinated with an Esmarch bandage and tourniquet was elevated to 350mmHg. An incision was carried out centered over the patella, extending two fingerbreadth's over the patella and to the level of the tibial tubercle. A medial parapatellar approach was utilized. The knee was taken into flexion. The medial and lateral meniscus, as well as the anterior cruciate ligament were resected. The posterior cruciate ligament was preserved. The [employer] A/SE custom femoral cutting block was placed, contacting the anterior femoral shaft and over the trochlear groove of the femur. This was an excellent fit. This was subsequently pinned and an oscillating saw was ultilized to create an anterior cut. The femur had previously sized to a size 4. A size 4 four-in-one cutting block was utilized to make the appropriate bone cuts. A size 4 femoral cruciate retaining trial was placed and found to be an excellent fit. The knee was taken into hyperflexion. Retractors were positioned to protect the soft tissue and neurovascular structures. The [employer] tibial cutting block was subsequently placed. There was excellent contact with the medial and lateral proximal tibial surface, as well as the anterior shaft of the tibia. It was subsequently pinned and an oscillating saw was utilized to create a proximal tibial cut. A size 5 tibial baseplate was found to be an excellent fit. A 9 mm trial polyethylene was placed and the knee was taken into extension, 10mm of the articular surface of the patella was resected with an oscillating saw and a 32 mm asymmetric patella trial was placed. The knee was taken through range of motion. With a no-thumb technique, there was no subluxation or dislocation of the patella. The knee ranged from 0 degrees of extension to 125 degrees of flexion by gravity. There was no instability with varus valgus stress testing with a 9 mm polyethylene.   The femur was drilled. The tibia was punched. The tissues were irrigated with 1000ml of sterile saline with Bacitracin utilizing pulsatile lavage. Next, the [facility] size 4 cruciate retaining femur, a size 5 tibial baseplate, with a 9mm tibial insert and a 32 mm all polyethelene symmetric patella were placed with methylacrylate bonding. After all cement had hardened, the excess cement was removed,  the knee was taken through a range of motion from 0 degrees of extension to 125+ degrees of flexion by gravity. There was no instability throughout range of motion and the patella tracked without subluxation. The tissues were irrigated a second time with 500ml of sterile saline with Bacitracin utilizing pulsatile lavage. A drain was then placed. The capsule was re-approximated with figure-of-eight #1 Vicryl suture. The skin edges were re approximated with 2-0 Vicryl in a subcutaneous fashion and the skin was closed with staples  A soft sterile dressing,  Ace wrap and knee immobilizer were applied. .  The patient was transferred to the postanesthesia care unit in stable condition.

## 2017-04-04 NOTE — CONSULTS
Medicine Consult    Patient:  Raad Lopes 78 y.o. female 1940  Asked to evaluate patient by Dr Skye John  Primary Care Provider:  Nadia No MD  Date of Admission:  4/4/2017  Reason for Consult: medical management      Assessment/Plan   1. OA sp TKA  2. 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 CAD with out angina  4. HTN  5. GERD  6. Osteoporosis      PLAN:  - continue lovenox w initiation of warfarin tomorrow  - INR daily, ask pharmacy to assist w warfarin dosing  - continue hctz w holding parameters  - montior hemoglobin daily  - pain management per orthopedics  - will follow with you    Patient Active Problem List   Diagnosis Code    Urge incontinence N39.41    Dyslipidemia E78.5    Hypertension I10    History of deep venous thrombosis Z86.718    History of pulmonary embolism Z86.711    Osteoarthritis M19.90    Pneumonia J18.9    MRSA nasal colonization Z22.322    Osteoarthritis of left knee M17.12    CAD (coronary artery disease) I25.10       Thank you for allowing us to participate in Ms Peguero's care  HPI:   CC: knee pain   Raad Lopes is a 78 y.o. female with past medical history significant for CAD, HTN, DVT & PE on chronic anticoagulation, OA, GERD is sp TKA. Medicine is asked to consult to help manage the above medical conditions. She reports she is having pain 6/10 at operative site, no radiation, worse w movement, better w analgesia, no associated symptoms. She denies cp, palpitaitons, nasuea or vomiting.      Past Medical History:   Diagnosis Date    Arthritis     osteoarthritis    Chronic pain     lower right back and right leg     Embolism and thrombosis of unspecified site 2008    PE    Fibromyalgia     GERD (gastroesophageal reflux disease)     H/O blood clots     High cholesterol     HTN (hypertension)     No meds    Other ill-defined conditions     Hiatal Hernia    PE (pulmonary thromboembolism) ([address] 2008    after right knee surgery    Thromboembolism ([address] prior to 2006    2 in the left knee     Thromboembolus [facility]) 2006    DVT      Urinary incontinence     UTI (lower urinary tract infection)      Past Surgical History:   Procedure Laterality Date    HX BACK SURGERY  2005    HX CATARACT REMOVAL      OU    HX HYSTERECTOMY  1970's    DEVIN    HX KNEE REPLACEMENT      RT    HX LUMBAR FUSION  2000    HX MOHS PROCEDURES      left    HX OOPHORECTOMY  1970's    one ovary removed    HX ORTHOPAEDIC      right foot bunionectomy and right second toe surgery    HX ORTHOPAEDIC      left heel surgery    HX ORTHOPAEDIC Right 08/05/2013    hip replacement    HX UROLOGICAL  02/28/2013    cyto-flexible cystourethroscope. The urethra was unremarkable. The trigone was normal.     TOTAL KNEE ARTHROPLASTY  2008    right      Social History   Substance Use Topics    Smoking status: Never Smoker    Smokeless tobacco: Never Used    Alcohol use Yes      Comment:  one bottle wine every 3 months     Family History   Problem Relation Age of Onset    SLE Paternal Aunt      No current facility-administered medications on file prior to encounter. Current Outpatient Prescriptions on File Prior to Encounter   Medication Sig Dispense Refill    traMADol (ULTRAM) 50 mg tablet Take 50 mg by mouth every six (6) hours as needed for Pain. Indications: FIBROMYALGIA      cycloSPORINE (RESTASIS) 0.05 % ophthalmic emulsion Administer 1 Drop to both eyes two (2) times a day. Indications: DRY EYE      sucralfate (CARAFATE) 100 mg/mL suspension Take 2 tsp by mouth two (2) times a day. Indications: Reflux, ulcer      magnesium oxide (MAG-OX) 400 mg tablet Take 400 mg by mouth two (2) times a day.  potassium chloride (K-DUR, KLOR-CON) 20 mEq tablet Take  by mouth two (2) times a day.  esomeprazole (NEXIUM) 40 mg capsule Take 40 mg by mouth daily.  Hydrochlorothiazide 12.5 mg tablet Take 12.5 mg by mouth daily.  Indications: Edema      calcium citrate-vitamin D3 (CITRACAL + D) tablet Take 1 Tab by mouth daily.  oxymetazoline (THEOPHYLLINE) 0.05 % nasal spray 2 Sprays two (2) times a day. Indications: Nasal Congestion      mirabegron ER (MYRBETRIQ) 25 mg ER tablet Take 1 Tab by mouth daily. Indications: URINARY URGE INCONTINENCE 90 Tab 3    warfarin (COUMADIN) 5 mg tablet Take 5 mg by mouth six (6) days a week. Indications: DEEP VENOUS THROMBOSIS      alendronate (FOSAMAX) 70 mg tablet Take 70 mg by mouth every Tuesday. Plus Vit D  Indications: POST-MENOPAUSAL OSTEOPOROSIS        Allergies   Allergen Reactions    Codeine Other (comments)     tinnitus and disorientation     Penicillin G Other (comments)     Yeast infection         Review of Systems  Constitutional: No fever, chills, diaphoresis, malaise, fatigue or weight gain/loss or falls  Skin: no itching or rashes  HEENT: no ear discomfort, hearing loss, tinnitus, epistaxis or sore throat  EYES: no blurry vision, double vision or photophobia  CARDIOVASCULAR: no claudication, cp, palpitations, orthopnea, pnd or LE edema  PULMONARY: no cough, wheeze, shortness of breath or sputum production  GI: no nausea, vomiting, diarrhea, abdominal pain, melena, hematemesis or brbpr  : no dysuria, hematuria  MUSCULOSKELETAL: no back pain, +joint pain - myalgias  ENDOCRINE: no heat/cold intolerance, polyuria or polydipsia  HEME: no easy bruising or bleeding  NEURO: no unilateral weakness, numbness, tingling or seizures      Physical Exam:      Visit Vitals    /57    Pulse 78    Temp 97.3 °F (36.3 °C)    Resp 15    Ht 5' 3\" (1.6 m)    Wt 87.1 kg (192 lb 1 oz)    SpO2 100%    BMI 34.02 kg/m2     Body mass index is 34.02 kg/(m^2).     Physical Exam:  GEN: well nourished, laying in bed in no acute distress  HEENT: atraumatic, nose normal,oropharynx clear, MMM  NECK: supple, trachea midline, no supraclavicular or submandibular adenopathy noted  EYES: conjuctiva normal, lids with out lesions, PERRL  HEART: RRR with out m/r/g, pmi nondisplaced, pulses 2+ distally, cap refil normal  LUNGS: equal chest wall expansion, cta bl with out wheezes/rales or rhonchi  AB: soft, +BS, nt/nd no organomegaly  NEURO: alert, awake and oriented x3, gait not assessed, cranial nerves intact, strength 5/5 bl UE and LE, sensation intact, reflexes nonpathological  SKIN: dry, intact, warm no breakdown noted  MS: knee wrapped cdi        Laboratory Studies:      Imaging studies personally reviewed:  Reviewed old records including old H&P, consultation notes and DC summaries        Emery Kim DO  Internal Medicine/Geriatrics

## 2017-04-04 NOTE — PERIOP NOTES
Patient transfer to room 209. Family notified. Handoff with Tammi RN. Blood pressure 131/57, pulse 78, temperature 97.3 °F (36.3 °C), resp. rate 15, height 5' 3\" (1.6 m), weight 87.1 kg (192 lb 1 oz), SpO2 100 %.

## 2017-04-04 NOTE — PROGRESS NOTES
Pharmacy - Vancomycin Dosing - ( Left Total Knee Arthroplasty )  Nasal MRSA Screen Positive 3/14/17   Vancomycin 1250 mg IV given at  14:09 4/4/17   Ginny CASEY ordered Vancomycin 500 mg IV q12hrs x 3 doses   and  ordered Vancomycin consult. Spoke with nurse and Dr. Romaine Patterson to confirm no ongoing infection. Changed Vancomycin to 1250 mg IV q12hrs x 2 doses. BMP ordered per MD with am labs 4/5/17. Please notify pharmacy if further dosing is indicated. Last Vancomycin Dose Scheduled for 12:00 4/5/17. End of Procedure 15:36 4/5/17. Carisa Sol.  [facility]   [phone]

## 2017-04-04 NOTE — IP AVS SNAPSHOT
Current Discharge Medication List  
  
START taking these medications Dose & Instructions Dispensing Information Comments Morning Noon Evening Bedtime HYDROmorphone 2 mg tablet Commonly known as:  DILAUDID Your last dose was: Your next dose is:    
   
   
 Dose:  2-4 mg Take 1-2 Tabs by mouth every four (4) hours as needed. Max Daily Amount: 24 mg. Quantity:  90 Tab Refills:  0 CONTINUE these medications which have CHANGED Dose & Instructions Dispensing Information Comments Morning Noon Evening Bedtime  
 enoxaparin 30 mg/0.3 mL injection Commonly known as:  LOVENOX What changed:   
- medication strength 
- how much to take - when to take this Your last dose was: Your next dose is:    
   
   
 Dose:  30 mg  
0.3 mL by SubCUTAneous route every twelve (12) hours every twelve (12) hours. Quantity:  14 Syringe Refills:  0 CONTINUE these medications which have NOT CHANGED Dose & Instructions Dispensing Information Comments Morning Noon Evening Bedtime BACTROBAN NASAL 2 % nasal ointment Generic drug:  mupirocin calcium Your last dose was: Your next dose is:    
   
   
 by Both Nostrils route two (2) times a day. Refills:  0  
     
   
   
   
  
 CARAFATE 100 mg/mL suspension Generic drug:  sucralfate Your last dose was: Your next dose is:    
   
   
 Dose:  2 tsp Take 2 tsp by mouth two (2) times a day. Indications: Reflux, ulcer Refills:  0 Citracal + D tablet Generic drug:  calcium citrate-vitamin D3 Your last dose was: Your next dose is:    
   
   
 Dose:  1 Tab Take 1 Tab by mouth daily. Refills:  0  
     
   
   
   
  
 * COUMADIN 5 mg tablet Generic drug:  warfarin Your last dose was: Your next dose is:    
   
   
 Dose:  5 mg Take 5 mg by mouth six (6) days a week. Indications: DEEP VENOUS THROMBOSIS Refills:  0  
     
   
   
   
  
 * COUMADIN 2.5 mg tablet Generic drug:  warfarin Your last dose was: Your next dose is:    
   
   
 Dose:  2.5 mg Take 2.5 mg by mouth every Tuesday. Indications: DEEP VENOUS THROMBOSIS Refills:  0  
     
   
   
   
  
 FOSAMAX 70 mg tablet Generic drug:  alendronate Your last dose was: Your next dose is:    
   
   
 Dose:  70 mg Take 70 mg by mouth every Tuesday. Plus Vit D  Indications: POST-MENOPAUSAL OSTEOPOROSIS Refills:  0  
     
   
   
   
  
 hydroCHLOROthiazide 12.5 mg tablet Commonly known as:  HYDRODIURIL Your last dose was: Your next dose is:    
   
   
 Dose:  12.5 mg Take 12.5 mg by mouth daily. Indications: Edema Refills:  0  
     
   
   
   
  
 magnesium oxide 400 mg tablet Commonly known as:  MAG-OX Your last dose was: Your next dose is:    
   
   
 Dose:  400 mg Take 400 mg by mouth two (2) times a day. Refills:  0  
     
   
   
   
  
 mirabegron ER 25 mg ER tablet Commonly known as:  MYRBETRIQ Your last dose was: Your next dose is:    
   
   
 Dose:  25 mg Take 1 Tab by mouth daily. Indications: URINARY URGE INCONTINENCE Quantity:  90 Tab Refills:  3 NexIUM 40 mg capsule Generic drug:  esomeprazole Your last dose was: Your next dose is:    
   
   
 Dose:  40 mg Take 40 mg by mouth daily. Refills:  0  
     
   
   
   
  
 potassium chloride 20 mEq tablet Commonly known as:  K-DUR, KLOR-CON Your last dose was: Your next dose is: Take  by mouth two (2) times a day. Refills:  0  
     
   
   
   
  
 RESTASIS 0.05 % ophthalmic emulsion Generic drug:  cycloSPORINE Your last dose was: Your next dose is:    
   
   
 Dose:  1 Drop Administer 1 Drop to both eyes two (2) times a day. Indications: DRY EYE Refills:  0  
     
   
   
   
  
 theophylline 0.05 % nasal spray Generic drug:  oxymetazoline Your last dose was: Your next dose is:    
   
   
 Dose:  2 Spray 2 Sprays two (2) times a day. Indications: Nasal Congestion Refills:  0  
     
   
   
   
  
 * Notice: This list has 2 medication(s) that are the same as other medications prescribed for you. Read the directions carefully, and ask your doctor or other care provider to review them with you. STOP taking these medications ACETAMINOPHEN EXTRA STRENGTH 500 mg tablet Generic drug:  acetaminophen  
   
  
 traMADol 50 mg tablet Commonly known as:  ULTRAM  
   
  
  
  
Where to Get Your Medications Information on where to get these meds will be given to you by the nurse or doctor. ! Ask your nurse or doctor about these medications  
  enoxaparin 30 mg/0.3 mL injection HYDROmorphone 2 mg tablet

## 2017-04-04 NOTE — PROGRESS NOTES
Problem: Mobility Impaired (Adult and Pediatric)  Goal: *Acute Goals and Plan of Care (Insert Text)  In 1-7 days pt will be able to perform:  ST. Bed mobility: Rolling L to R to L modified independent for positioning. 2. Supine to sit to supine S with HR for meals. 3. Sit to stand to sit S with RW in prep for ambulation. LT. Gait: Ambulate >150ft S with RW, WBAT, for home/community mobility. 2. Stair Negotiation: Ascend/descend >2 steps CGA with HR for home entry. 3. Activity tolerance: Tolerate up in chair 1-2 hours for ADLs. 4. Patient/Family Education: Patient/family to be independent with HEP for follow-up care and safe discharge. PHYSICAL THERAPY EVALUATION     Patient: Angelina Arroyo (16 y.o. female)  Date: 2017  Primary Diagnosis: OSTEOARTHRITIS LEFT KNEE,DIABETIC TYPE II,ELEVATED CHOLESTEROL,REFLUX,HX OF DVT  Osteoarthritis of left knee  Procedure(s) (LRB):  LEFT TOTAL KNEE ARTHROPLASTY - SPEC POP, **CONTACT ISOLATION** (Left) Day of Surgery   Precautions:   Fall, WBAT      ASSESSMENT :  Based on the objective data described below, the patient presents with decreased functional mobility and independence in regard to bed mobility, transfers, gt quality and tolerance, L knee AROM, L knee strength, pain, stair negotiation and safety due to recent L TKA surgery. Pt rating pain in L knee 6/10 on numerical pain scale. Pt unable to perform functional SLR on L. Donned KI for L knee support and pt buckled through [address] during attempt of steps. Pt able to stand and sit on bedpan on EOB to void. Pt required mod A x2 for sit to stand and for standing w/ hygiene. Pt returned to supine in bed w/ all needs within reach, SCDs applied and ice pack to L knee. Nurse Tammi aware. [facility] hospital d/c. Patient will benefit from skilled intervention to address the above impairments.   Patients rehabilitation potential is considered to be Fair  Factors which may influence rehabilitation potential include:   [ ]         None noted  [ ]         Mental ability/status  [ ]         Medical condition  [ ]         Home/family situation and support systems  [ ]         Safety awareness  [X]         Pain tolerance/management  [ ]         Other:        PLAN :  Recommendations and Planned Interventions:  [X]           Bed Mobility Training             [ ]    Neuromuscular Re-Education  [X]           Transfer Training                   [ ]    Orthotic/Prosthetic Training  [X]           Gait Training                          [ ]    Modalities  [X]           Therapeutic Exercises          [ ]    Edema Management/Control  [X]           Therapeutic Activities            [X]    Patient and Family Training/Education  [ ]           Other (comment):     Frequency/Duration: Patient will be followed by physical therapy twice daily to address goals. Discharge Recommendations: To Be Determined  Further Equipment Recommendations for Discharge: N/A       SUBJECTIVE:   Patient stated I need to go to the bathroom.       OBJECTIVE DATA SUMMARY:       Past Medical History:   Diagnosis Date    Arthritis       osteoarthritis    Chronic pain       lower right back and right leg     Embolism and thrombosis of unspecified site 2008     PE    Fibromyalgia      GERD (gastroesophageal reflux disease)      H/O blood clots      High cholesterol      HTN (hypertension)       No meds    Other ill-defined conditions       Hiatal Hernia    PE (pulmonary thromboembolism) ([address] 2008     after right knee surgery    Thromboembolism ([address] prior to 2006     2 in the left knee     Thromboembolus ([address] 2006     DVT      Urinary incontinence      UTI (lower urinary tract infection)       Past Surgical History:   Procedure Laterality Date    HX BACK SURGERY   2005    HX CATARACT REMOVAL         OU    HX HYSTERECTOMY   1970's     DEVIN    HX KNEE REPLACEMENT         RT    HX LUMBAR FUSION   2000    HX MOHS PROCEDURES left    HX OOPHORECTOMY   1970's     one ovary removed    HX ORTHOPAEDIC         right foot bunionectomy and right second toe surgery    HX ORTHOPAEDIC         left heel surgery    HX ORTHOPAEDIC Right 08/05/2013     hip replacement    HX UROLOGICAL   02/28/2013     cyto-flexible cystourethroscope. The urethra was unremarkable. The trigone was normal.     TOTAL KNEE ARTHROPLASTY   2008     right     Barriers to Learning/Limitations: None  Compensate with: visual, verbal, tactile, kinesthetic cues/model  Prior Level of Function/Home Situation:   Home Situation  Home Environment: Private residence  # Steps to Enter: 4  Rails to Enter: Yes  Hand Rails : Bilateral  One/Two Story Residence: One story  Living Alone: No  Support Systems: Family member(s)  Patient Expects to be Discharged to[de-identified] Private residence  Current DME Used/Available at Home: Commode, bedside, Cane, straight, Walker, rolling  Critical Behavior:  Neurologic State: Alert; Appropriate for age  Orientation Level: Oriented X4  Cognition: Appropriate decision making; Appropriate for age attention/concentration; Appropriate safety awareness; Follows commands  Safety/Judgement: Awareness of environment  Psychosocial  Patient Behaviors: Calm; Cooperative  Skin Condition/Temp: Dry;Warm  Skin Integrity: Incision (comment) (L knee)  Skin Integumentary  Skin Color: Appropriate for ethnicity  Skin Condition/Temp: Dry;Warm  Skin Integrity: Incision (comment) (L knee)  Turgor: Non-tenting  Hair Growth: Present  Varicosities: Absent  Strength:    Strength: Generally decreased, functional  Tone & Sensation:   Tone: Normal  Sensation: Intact  Range Of Motion:  AROM: Generally decreased, functional  Functional Mobility:  Bed Mobility:  Supine to Sit: Minimum assistance (vc)  Sit to Supine: Minimum assistance (vc)  Scooting: Minimum assistance (vc)  Transfers:  Sit to Stand:  Moderate assistance;Assist x2 (vc)  Stand to Sit: Moderate assistance;Assist x2 (vc)  Balance:   Sitting: Intact  Standing: Impaired; With support  Standing - Static: Poor;Constant support  Standing - Dynamic : Poor  Ambulation/Gait Training:  Distance (ft): 1 Feet (ft)  Assistive Device: Walker, rolling;Gait belt;Brace/Splint  Ambulation - Level of Assistance: Moderate assistance;Assist x2  Gait Abnormalities: Antalgic;Decreased step clearance (buckling through KI L)  Left Side Weight Bearing: As tolerated  Base of Support: Shift to right  Stance: Left decreased  Speed/Anh: Slow  Step Length: Left shortened;Right shortened  Swing Pattern: Left asymmetrical;Right asymmetrical  Interventions: Safety awareness training;Verbal cues  Therapeutic Exercises:   HEP written copy issued to pt per MD protocol. Pain:  Pain Scale 1: Numeric (0 - 10)  Pain Intensity 1: 6  Pain Location 1: Knee  Pain Orientation 1: Left  Pain Description 1: Aching  Pain Intervention(s) 1: Ice  Activity Tolerance:   Fair   Please refer to the flowsheet for vital signs taken during this treatment. After treatment:   [ ]         Patient left in no apparent distress sitting up in chair  [X]         Patient left in no apparent distress in bed  [X]         Call bell left within reach  [X]         Nursing notified  [ ]         Caregiver present  [ ]         Bed alarm activated      COMMUNICATION/EDUCATION:   [X]         Fall prevention education was provided and the patient/caregiver indicated understanding. [X]         Patient/family have participated as able in goal setting and plan of care. [X]         Patient/family agree to work toward stated goals and plan of care. [ ]         Patient understands intent and goals of therapy, but is neutral about his/her participation. [ ]         Patient is unable to participate in goal setting and plan of care.      Thank you for this referral.  Norman Barrett, PT   Time Calculation: 26 mins  Eval Complexity: History: HIGH Complexity :3+ comorbidities / personal factors will impact the outcome/ POC Exam:LOW Complexity : 1-2 Standardized tests and measures addressing body structure, function, activity limitation and / or participation in recreation  Presentation: MEDIUM Complexity : Evolving with changing characteristics  Clinical Decision Making:High Complexity Stood Overall Complexity:LOW

## 2017-04-04 NOTE — IP AVS SNAPSHOT
[address] [address] 
[phone] Patient: Marybeth Resendez MRN: [id number] :1940 You are allergic to the following Allergen Reactions Codeine Other (comments)  
 tinnitus and disorientation Penicillin G Other (comments) Yeast infection Recent Documentation Height Weight BMI OB Status Smoking Status 1.6 m 87.1 kg 34.02 kg/m2 Hysterectomy Never Smoker Emergency Contacts Name Discharge Info Relation Home Work [facility] DISCHARGE CAREGIVER [3] Son [22] [phone] [facility] DISCHARGE CAREGIVER [3] Other Relative [6] [phone] Basilio Soriano DISCHARGE CAREGIVER [3] Child [2] [phone] About your hospitalization You were admitted on:  2017 You last received care in the:  [facility] [address] You were discharged on:  2017 Unit phone number:  [phone] Why you were hospitalized Your primary diagnosis was:  Osteoarthritis Of Left Knee Your diagnoses also included:  Mrsa Nasal Colonization, Cad (Coronary Artery Disease), Anemia Due To Acute Blood Loss Providers Seen During Your Hospitalizations Provider Role Specialty Primary office phone Eboni Costa MD Attending Provider Orthopedic Surgery [phone] Your Primary Care Physician (PCP) Primary Care Physician Office Phone Office Fax [address] [place] [address] [place] [phone] Follow-up Information Follow up With Details Comments Contact Info Eboni Costa MD On 2017 Follow up appointment @ 9:30am [facility] MADHURI BECK [facility] Orthopedic and [address] [address] 
[phone] Davi Deng MD   [address] 
[phone] Current Discharge Medication List  
  
START taking these medications Dose & Instructions Dispensing Information Comments Morning Noon Evening Bedtime HYDROmorphone 2 mg tablet Commonly known as:  DILAUDID Your last dose was: Your next dose is:    
   
   
 Dose:  2-4 mg Take 1-2 Tabs by mouth every four (4) hours as needed. Max Daily Amount: 24 mg. Quantity:  90 Tab Refills:  0 CONTINUE these medications which have CHANGED Dose & Instructions Dispensing Information Comments Morning Noon Evening Bedtime  
 enoxaparin 30 mg/0.3 mL injection Commonly known as:  LOVENOX What changed:   
- medication strength 
- how much to take - when to take this Your last dose was: Your next dose is:    
   
   
 Dose:  30 mg  
0.3 mL by SubCUTAneous route every twelve (12) hours every twelve (12) hours. Quantity:  14 Syringe Refills:  0 CONTINUE these medications which have NOT CHANGED Dose & Instructions Dispensing Information Comments Morning Noon Evening Bedtime BACTROBAN NASAL 2 % nasal ointment Generic drug:  mupirocin calcium Your last dose was: Your next dose is:    
   
   
 by Both Nostrils route two (2) times a day. Refills:  0  
     
   
   
   
  
 CARAFATE 100 mg/mL suspension Generic drug:  sucralfate Your last dose was: Your next dose is:    
   
   
 Dose:  2 tsp Take 2 tsp by mouth two (2) times a day. Indications: Reflux, ulcer Refills:  0 Citracal + D tablet Generic drug:  calcium citrate-vitamin D3 Your last dose was: Your next dose is:    
   
   
 Dose:  1 Tab Take 1 Tab by mouth daily. Refills:  0  
     
   
   
   
  
 * COUMADIN 5 mg tablet Generic drug:  warfarin Your last dose was: Your next dose is:    
   
   
 Dose:  5 mg Take 5 mg by mouth six (6) days a week. Indications: DEEP VENOUS THROMBOSIS Refills:  0 * COUMADIN 2.5 mg tablet Generic drug:  warfarin Your last dose was: Your next dose is:    
   
   
 Dose:  2.5 mg Take 2.5 mg by mouth every Tuesday. Indications: DEEP VENOUS THROMBOSIS Refills:  0  
     
   
   
   
  
 FOSAMAX 70 mg tablet Generic drug:  alendronate Your last dose was: Your next dose is:    
   
   
 Dose:  70 mg Take 70 mg by mouth every Tuesday. Plus Vit D  Indications: POST-MENOPAUSAL OSTEOPOROSIS Refills:  0  
     
   
   
   
  
 hydroCHLOROthiazide 12.5 mg tablet Commonly known as:  HYDRODIURIL Your last dose was: Your next dose is:    
   
   
 Dose:  12.5 mg Take 12.5 mg by mouth daily. Indications: Edema Refills:  0  
     
   
   
   
  
 magnesium oxide 400 mg tablet Commonly known as:  MAG-OX Your last dose was: Your next dose is:    
   
   
 Dose:  400 mg Take 400 mg by mouth two (2) times a day. Refills:  0  
     
   
   
   
  
 mirabegron ER 25 mg ER tablet Commonly known as:  MYRBETRIQ Your last dose was: Your next dose is:    
   
   
 Dose:  25 mg Take 1 Tab by mouth daily. Indications: URINARY URGE INCONTINENCE Quantity:  90 Tab Refills:  3 NexIUM 40 mg capsule Generic drug:  esomeprazole Your last dose was: Your next dose is:    
   
   
 Dose:  40 mg Take 40 mg by mouth daily. Refills:  0  
     
   
   
   
  
 potassium chloride 20 mEq tablet Commonly known as:  K-DUR, KLOR-CON Your last dose was: Your next dose is: Take  by mouth two (2) times a day. Refills:  0  
     
   
   
   
  
 RESTASIS 0.05 % ophthalmic emulsion Generic drug:  cycloSPORINE Your last dose was: Your next dose is:    
   
   
 Dose:  1 Drop Administer 1 Drop to both eyes two (2) times a day. Indications: DRY EYE Refills:  0  
     
   
   
   
  
 theophylline 0.05 % nasal spray Generic drug:  oxymetazoline Your last dose was: Your next dose is:    
   
   
 Dose:  2 Spray 2 Sprays two (2) times a day. Indications: Nasal Congestion Refills:  0  
     
   
   
   
  
 * Notice: This list has 2 medication(s) that are the same as other medications prescribed for you. Read the directions carefully, and ask your doctor or other care provider to review them with you. STOP taking these medications ACETAMINOPHEN EXTRA STRENGTH 500 mg tablet Generic drug:  acetaminophen  
   
  
 traMADol 50 mg tablet Commonly known as:  ULTRAM  
   
  
  
  
Where to Get Your Medications Information on where to get these meds will be given to you by the nurse or doctor. ! Ask your nurse or doctor about these medications  
  enoxaparin 30 mg/0.3 mL injection HYDROmorphone 2 mg tablet Discharge Instructions Dr. Renetta Alejandre Instructions Total Knee Replacement ACTIVITIES : 
1. You may be up and walking about the house with your walker. 2.  Activities around the house, such as washing dishes, fixing light meals, and your own personal care are fine. 3.  Avoid strenuous activities, such as vacuuming, lifting laundry or grocery bags. 4.  Walking is the best way to rebuild strength and stamina. Start SLOWLY and gradually increase your distance. 5.  Avoid any jogging, running or excessive stair-climbing 6. Your home physical therapist will work with you and your range-of-motion for the first 7-14 days. After your first visit with Dr. Moe Pemberton you will be scheduled for out-patient physical therapy at a site convenient for you. 7.  Follow-up with Dr. Moe Pemberton in 10-14 days. BATHING and INCISION CARE: 
1. The incision may be tender to touch or feel numb: this is normal.  
2.  Keep the incision clean and dry no showering until your follow-up appointment. The incision will be closed with sutures under the skin. 3.  Do not apply any lotions, ointments or oils on the incision. 4.  If you notice any excessive swelling, redness, or persistent drainage around the incision, notify the office immediately. DRIVIN. You should not drive until after your follow-up appointment. 2.  You can be in a vehicle for short distances, but if you travel any long distance, please stop about every 30 minutes and walk/stretch. 3.  You should NEVER drive while taking narcotic medication. 4.  Driving will be permitted on right knee replacements after the therapist has confirmed a range-of-motion of a 105 degrees. Left knee replacements may drive at 2 weeks post-op. RETURN TO WORK : 
1. The decision to return to work will be determined on an individual basis. 2.  Many people who have a strenuous job (construction, heavy labor, etc) may need to be off work for up to 12 weeks. 3.  If you need a work note, please let us know as soon as possible, and not the same day you are planning to return to work. NUTRITION : 
1.  Good nutrition is an essential part of healing. 2.  You should eat a balanced diet each day, including fruits, vegetables, dairy products and protein. 3.  Remember to drink plenty of water. 4.  If you have not had a bowel movement within 3 days of surgery, you will need to use a laxative or suppository that can be obtained over-the-counter at your local pharmacy. MEDICATIONS - 
1. You may resume the medications you were taking before surgery. 2.  You will receive a prescription for pain medication at discharge from the hospital. The pain medication works best if taken before the pain becomes severe. 3.  To reduce stomach upset, always take the medication with food. 4.  Begin to wean yourself off the pain medication during the second week after discharge.   
5.  If you need a refill, please call the office during working hours at least 2 days before your prescription runs out. Do not wait until your bottle is empty to call for a refill. 6.  You will also be prescribed a blood thinner that you will take by mouth for 10-14 days post-operatively. 7.  DO NOT drive if you are taking narcotic pain medications. HOME HEALTH CARE: 
1.   A home health care service has been set-up for you to help assist you once you leave the hospital. 
2.  They will contact you either before you leave the hospital or within 24 hours once you have been discharged home. 3. A nurse will assist you with your dressing changes and a Physical Therapist with help you with your therapy needs. 4.  A CPM machine will be delivered to your home. The CPM machine will begin at 0-70 degrees at home and you can add 5 degrees to your range-of-motion each day as tolerated. To a maximum of 120 degrees. CALL THE OFFICE: 
? If you have severe pain unrelieved by the medications; 
? If you have a fever of 101.0°F or greater;  
? If you notice excessive swelling, redness, or persistent drainage from the incision or IV site; [facility] office number is [phone] from 8:00am to 5:00pm Monday through Friday. After 5:00pm, on weekends, or holidays, please leave a message with our answering service and the doctor on-call will get back to you shortly. Discharge Orders None Introducing [facility][note 2]! Mando Garcia introduces [employer] patient portal. Now you can access parts of your medical record, email your doctor's office, and request medication refills online. 1. In your internet browser, go to [email or web address] 2. Click on the First Time User? Click Here link in the Sign In box. You will see the New Member Sign Up page. 3. Enter your [employer] Access Code exactly as it appears below. You will not need to use this code after youve completed the sign-up process.  If you do not sign up before the expiration date, you must request a new code. · [employer] Access Code: [email or web address] Expires: 6/6/2017  8:29 PM 
 
4. Enter the last four digits of your Social Security Number (xxxx) and Date of Birth (mm/dd/yyyy) as indicated and click Submit. You will be taken to the next sign-up page. 5. Create a [employer] ID. This will be your [employer] login ID and cannot be changed, so think of one that is secure and easy to remember. 6. Create a [employer] password. You can change your password at any time. 7. Enter your Password Reset Question and Answer. This can be used at a later time if you forget your password. 8. Enter your e-mail address. You will receive e-mail notification when new information is available in [address] 9. Click Sign Up. You can now view and download portions of your medical record. 10. Click the Download Summary menu link to download a portable copy of your medical information. If you have questions, please visit the Frequently Asked Questions section of the [employer] website. Remember, [employer] is NOT to be used for urgent needs. For medical emergencies, dial 911. Now available from your iPhone and Android! General Information Please provide this summary of care documentation to your next provider. Patient Signature:  ____________________________________________________________ Date:  ____________________________________________________________  
  
[facility] Provider Signature:  ____________________________________________________________ Date:  ____________________________________________________________

## 2017-04-04 NOTE — ANESTHESIA PROCEDURE NOTES
Peripheral Block    Start time: 4/4/2017 12:42 PM  End time: 4/4/2017 1:00 PM  Performed by: Rush Arroyo by: Ana Cristina Santoro       Pre-procedure: Indications: at surgeon's request and post-op pain management    Preanesthetic Checklist: patient identified, risks and benefits discussed, site marked, timeout performed, anesthesia consent given and patient being monitored    Timeout Time: 12:42          Block Type:   Block Type:  Femoral continuous  Laterality:  Left  Monitoring:  Standard ASA monitoring, responsive to questions, continuous pulse ox, oxygen, frequent vital sign checks and heart rate  Injection Technique:  Continuous  Procedures: ultrasound guided    Patient Position: supine  Prep: chlorhexidine    Location:  Upper thigh  Needle Type:  Tuohy  Needle Gauge:  18 G  Needle Localization:  Nerve stimulator and ultrasound guidance  Motor Response: minimal motor response >0.4 mA    Medication Injected:  1.0%  lidocaine  Volume (mL):  3    Assessment:  Number of attempts:  1  Injection Assessment:  Incremental injection every 5 mL, negative aspiration for CSF, no paresthesia, ultrasound image on chart, local visualized surrounding nerve on ultrasound, negative aspiration for blood and no intravascular symptoms    Procedure and alternatives explained. Risks explained including bleeding, infection, nerve injury, failed block. Procedure explained as elective. Pt understands and desires to proceed. Patient able to straight leg raise  left leg after block. No sensory or motor block.

## 2017-04-04 NOTE — ANESTHESIA PREPROCEDURE EVALUATION
Anesthetic History   No history of anesthetic complications            Review of Systems / Medical History  Patient summary reviewed, nursing notes reviewed and pertinent labs reviewed    Pulmonary  Within defined limits                 Neuro/Psych   Within defined limits           Cardiovascular    Hypertension              Exercise tolerance: >4 METS     GI/Hepatic/Renal                Endo/Other             Other Findings              Physical Exam    Airway  Mallampati: II  TM Distance: 4 - 6 cm  Neck ROM: normal range of motion   Mouth opening: Normal     Cardiovascular  Regular rate and rhythm,  S1 and S2 normal,  no murmur, click, rub, or gallop  Rhythm: regular  Rate: normal         Dental    Dentition: Full upper dentures and Full lower dentures     Pulmonary  Breath sounds clear to auscultation               Abdominal  GI exam deferred       Other Findings            Anesthetic Plan    ASA: 3  Anesthesia type: general      Post-op pain plan if not by surgeon: peripheral nerve block continuous    Induction: Intravenous  Anesthetic plan and risks discussed with: Patient and Family

## 2017-04-04 NOTE — PERIOP NOTES
Dr Basilio Moss notified patient had Lovenox last on 4/3/17 at 1030 and coumadin last on 3/27/17. No orders given. Okay to proceed.

## 2017-04-04 NOTE — ROUTINE PROCESS
TRANSFER - IN REPORT:    Verbal report received from TEMI An RN(name) on Curis  being received from [id number]) for routine progression of care      Report consisted of patients Situation, Background, Assessment and   Recommendations(SBAR). Information from the following report(s) SBAR, Kardex, Intake/Output, MAR and Recent Results was reviewed with the receiving nurse. Opportunity for questions and clarification was provided.

## 2017-04-04 NOTE — INTERVAL H&P NOTE
H&P Update:  George Wasserman was seen and examined. History and physical has been reviewed. The patient has been examined.  There have been no significant clinical changes since the completion of the originally dated History and Physical.    Signed By: Hilaria Morrow MD     April 4, 2017 7:29 AM

## 2017-04-04 NOTE — ANESTHESIA POSTPROCEDURE EVALUATION
Post-Anesthesia Evaluation and Assessment    Cardiovascular Function/Vital Signs  Visit Vitals    /75    Pulse 77    Temp 36.4 °C (97.6 °F)    Resp 14    Ht 5' 3\" (1.6 m)    Wt 87.1 kg (192 lb 1 oz)    SpO2 99%    BMI 34.02 kg/m2       Patient is status post Procedure(s):  LEFT TOTAL KNEE ARTHROPLASTY - SPEC POP, **CONTACT ISOLATION**. Nausea/Vomiting: Controlled. Postoperative hydration reviewed and adequate. Pain:  Pain Scale 1: (P) Numeric (0 - 10) (04/04/17 1640)  Pain Intensity 1: (P) 3 (04/04/17 1640)   Managed. Neurological Status:   Neuro (WDL): Within Defined Limits (04/04/17 1620)   At baseline. Mental Status and Level of Consciousness: Baseline and stable. Pulmonary Status:   O2 Device: Nasal cannula (04/04/17 1620)   Adequate oxygenation and airway patent. Complications related to anesthesia: None    Post-anesthesia assessment completed. No concerns. Patient has met all discharge requirements.     Signed By: Tj Mackenzie MD

## 2017-04-05 ENCOUNTER — APPOINTMENT (OUTPATIENT)
Dept: GENERAL RADIOLOGY | Age: 77
DRG: 469 | End: 2017-04-05
Attending: HOSPITALIST
Payer: MEDICARE

## 2017-04-05 PROBLEM — D62 ANEMIA DUE TO ACUTE BLOOD LOSS: Status: ACTIVE | Noted: 2017-04-05

## 2017-04-05 PROBLEM — E83.42 HYPOMAGNESEMIA: Status: ACTIVE | Noted: 2017-04-05

## 2017-04-05 PROBLEM — M17.12 OSTEOARTHRITIS OF LEFT KNEE: Status: RESOLVED | Noted: 2017-03-22 | Resolved: 2017-04-05

## 2017-04-05 LAB
ANION GAP BLD CALC-SCNC: 8 MMOL/L (ref 3–18)
BUN SERPL-MCNC: 10 MG/DL (ref 7–18)
BUN/CREAT SERPL: 14 (ref 12–20)
CALCIUM SERPL-MCNC: 8.3 MG/DL (ref 8.5–10.1)
CHLORIDE SERPL-SCNC: 108 MMOL/L (ref 100–108)
CO2 SERPL-SCNC: 27 MMOL/L (ref 21–32)
CREAT SERPL-MCNC: 0.74 MG/DL (ref 0.6–1.3)
GLUCOSE SERPL-MCNC: 137 MG/DL (ref 74–99)
HCT VFR BLD AUTO: 25.5 % (ref 35–45)
HCT VFR BLD AUTO: 27.7 % (ref 35–45)
HGB BLD-MCNC: 8.2 G/DL (ref 12–16)
HGB BLD-MCNC: 8.9 G/DL (ref 12–16)
INR PPP: 1.1 (ref 0.8–1.2)
MAGNESIUM SERPL-MCNC: 1.3 MG/DL (ref 1.6–2.6)
POTASSIUM SERPL-SCNC: 3.8 MMOL/L (ref 3.5–5.5)
PROTHROMBIN TIME: 14 SEC (ref 11.5–15.2)
SODIUM SERPL-SCNC: 143 MMOL/L (ref 136–145)

## 2017-04-05 PROCEDURE — 36415 COLL VENOUS BLD VENIPUNCTURE: CPT | Performed by: PHYSICIAN ASSISTANT

## 2017-04-05 PROCEDURE — 71010 XR CHEST PORT: CPT

## 2017-04-05 PROCEDURE — 74011000250 HC RX REV CODE- 250: Performed by: PHYSICIAN ASSISTANT

## 2017-04-05 PROCEDURE — 74011250637 HC RX REV CODE- 250/637: Performed by: PHYSICIAN ASSISTANT

## 2017-04-05 PROCEDURE — 80048 BASIC METABOLIC PNL TOTAL CA: CPT | Performed by: PHYSICIAN ASSISTANT

## 2017-04-05 PROCEDURE — 74011258636 HC RX REV CODE- 258/636: Performed by: PHYSICIAN ASSISTANT

## 2017-04-05 PROCEDURE — 97166 OT EVAL MOD COMPLEX 45 MIN: CPT

## 2017-04-05 PROCEDURE — 71020 XR CHEST AP LAT: CPT

## 2017-04-05 PROCEDURE — 74011250637 HC RX REV CODE- 250/637: Performed by: INTERNAL MEDICINE

## 2017-04-05 PROCEDURE — 97530 THERAPEUTIC ACTIVITIES: CPT

## 2017-04-05 PROCEDURE — 85610 PROTHROMBIN TIME: CPT | Performed by: PHYSICIAN ASSISTANT

## 2017-04-05 PROCEDURE — 74011250636 HC RX REV CODE- 250/636: Performed by: ANESTHESIOLOGY

## 2017-04-05 PROCEDURE — 65270000029 HC RM PRIVATE

## 2017-04-05 PROCEDURE — 74011250636 HC RX REV CODE- 250/636: Performed by: HOSPITALIST

## 2017-04-05 PROCEDURE — 74011250637 HC RX REV CODE- 250/637: Performed by: HOSPITALIST

## 2017-04-05 PROCEDURE — 74011250636 HC RX REV CODE- 250/636: Performed by: PHYSICIAN ASSISTANT

## 2017-04-05 PROCEDURE — 83735 ASSAY OF MAGNESIUM: CPT | Performed by: HOSPITALIST

## 2017-04-05 PROCEDURE — 85018 HEMOGLOBIN: CPT | Performed by: PHYSICIAN ASSISTANT

## 2017-04-05 RX ORDER — MAGNESIUM SULFATE HEPTAHYDRATE 40 MG/ML
2 INJECTION, SOLUTION INTRAVENOUS
Status: COMPLETED | OUTPATIENT
Start: 2017-04-05 | End: 2017-04-05

## 2017-04-05 RX ORDER — ENOXAPARIN SODIUM 100 MG/ML
1 INJECTION SUBCUTANEOUS EVERY 12 HOURS
Status: COMPLETED | OUTPATIENT
Start: 2017-04-05 | End: 2017-04-07

## 2017-04-05 RX ORDER — LEVOFLOXACIN 5 MG/ML
500 INJECTION, SOLUTION INTRAVENOUS EVERY 24 HOURS
Status: DISCONTINUED | OUTPATIENT
Start: 2017-04-05 | End: 2017-04-06

## 2017-04-05 RX ORDER — ENOXAPARIN SODIUM 100 MG/ML
30 INJECTION SUBCUTANEOUS EVERY 12 HOURS
Qty: 14 SYRINGE | Refills: 0 | Status: SHIPPED | OUTPATIENT
Start: 2017-04-05 | End: 2017-07-13

## 2017-04-05 RX ORDER — ACETAMINOPHEN 325 MG/1
650 TABLET ORAL
Status: DISCONTINUED | OUTPATIENT
Start: 2017-04-05 | End: 2017-04-08 | Stop reason: HOSPADM

## 2017-04-05 RX ORDER — WARFARIN SODIUM 5 MG/1
5 TABLET ORAL ONCE
Status: COMPLETED | OUTPATIENT
Start: 2017-04-05 | End: 2017-04-05

## 2017-04-05 RX ORDER — HYDROMORPHONE HYDROCHLORIDE 2 MG/1
2-4 TABLET ORAL
Qty: 90 TAB | Refills: 0 | Status: SHIPPED | OUTPATIENT
Start: 2017-04-05 | End: 2017-07-13

## 2017-04-05 RX ADMIN — Medication 400 MG: at 20:33

## 2017-04-05 RX ADMIN — SODIUM CHLORIDE, SODIUM LACTATE, POTASSIUM CHLORIDE, CALCIUM CHLORIDE, AND DEXTROSE MONOHYDRATE 125 ML/HR: 600; 310; 30; 20; 5 INJECTION, SOLUTION INTRAVENOUS at 23:34

## 2017-04-05 RX ADMIN — HYDROMORPHONE HYDROCHLORIDE 4 MG: 4 TABLET ORAL at 08:47

## 2017-04-05 RX ADMIN — VANCOMYCIN HYDROCHLORIDE 1250 MG: 10 INJECTION, POWDER, LYOPHILIZED, FOR SOLUTION INTRAVENOUS at 00:07

## 2017-04-05 RX ADMIN — SUCRALFATE 1 G: 1 SUSPENSION ORAL at 20:33

## 2017-04-05 RX ADMIN — ENOXAPARIN SODIUM 30 MG: 30 INJECTION SUBCUTANEOUS at 08:46

## 2017-04-05 RX ADMIN — DOCUSATE SODIUM 100 MG: 100 CAPSULE, LIQUID FILLED ORAL at 20:33

## 2017-04-05 RX ADMIN — ACETAMINOPHEN 650 MG: 325 TABLET, FILM COATED ORAL at 16:33

## 2017-04-05 RX ADMIN — WARFARIN SODIUM 5 MG: 5 TABLET ORAL at 18:20

## 2017-04-05 RX ADMIN — POTASSIUM CHLORIDE 20 MEQ: 20 TABLET, EXTENDED RELEASE ORAL at 20:33

## 2017-04-05 RX ADMIN — DOCUSATE SODIUM 100 MG: 100 CAPSULE, LIQUID FILLED ORAL at 08:47

## 2017-04-05 RX ADMIN — MAGNESIUM SULFATE HEPTAHYDRATE 2 G: 40 INJECTION, SOLUTION INTRAVENOUS at 16:34

## 2017-04-05 RX ADMIN — HYDROCHLOROTHIAZIDE 12.5 MG: 25 TABLET ORAL at 08:47

## 2017-04-05 RX ADMIN — FERROUS SULFATE TAB 325 MG (65 MG ELEMENTAL FE) 325 MG: 325 (65 FE) TAB at 16:33

## 2017-04-05 RX ADMIN — SODIUM CHLORIDE, SODIUM LACTATE, POTASSIUM CHLORIDE, CALCIUM CHLORIDE, AND DEXTROSE MONOHYDRATE 125 ML/HR: 600; 310; 30; 20; 5 INJECTION, SOLUTION INTRAVENOUS at 04:30

## 2017-04-05 RX ADMIN — LEVOFLOXACIN 500 MG: 5 INJECTION, SOLUTION INTRAVENOUS at 18:19

## 2017-04-05 RX ADMIN — FERROUS SULFATE TAB 325 MG (65 MG ELEMENTAL FE) 325 MG: 325 (65 FE) TAB at 08:46

## 2017-04-05 RX ADMIN — ENOXAPARIN SODIUM 90 MG: 100 INJECTION SUBCUTANEOUS at 20:33

## 2017-04-05 RX ADMIN — HYDROMORPHONE HYDROCHLORIDE 4 MG: 4 TABLET ORAL at 20:33

## 2017-04-05 RX ADMIN — HYDROMORPHONE HYDROCHLORIDE 2 MG: 2 TABLET ORAL at 15:02

## 2017-04-05 RX ADMIN — POTASSIUM CHLORIDE 20 MEQ: 20 TABLET, EXTENDED RELEASE ORAL at 08:47

## 2017-04-05 RX ADMIN — PANTOPRAZOLE SODIUM 40 MG: 40 TABLET, DELAYED RELEASE ORAL at 08:48

## 2017-04-05 RX ADMIN — CYCLOSPORINE 1 DROP: 0.5 EMULSION OPHTHALMIC at 09:00

## 2017-04-05 RX ADMIN — HYDROMORPHONE HYDROCHLORIDE 2 MG: 2 TABLET ORAL at 04:30

## 2017-04-05 RX ADMIN — HYDROMORPHONE HYDROCHLORIDE 4 MG: 4 TABLET ORAL at 00:19

## 2017-04-05 RX ADMIN — VANCOMYCIN HYDROCHLORIDE 1250 MG: 10 INJECTION, POWDER, LYOPHILIZED, FOR SOLUTION INTRAVENOUS at 11:20

## 2017-04-05 RX ADMIN — MAGNESIUM SULFATE HEPTAHYDRATE 2 G: 40 INJECTION, SOLUTION INTRAVENOUS at 15:02

## 2017-04-05 RX ADMIN — CYCLOSPORINE 1 DROP: 0.5 EMULSION OPHTHALMIC at 20:33

## 2017-04-05 RX ADMIN — SUCRALFATE 1 G: 1 SUSPENSION ORAL at 08:47

## 2017-04-05 RX ADMIN — Medication 400 MG: at 08:46

## 2017-04-05 NOTE — PROGRESS NOTES
Problem: Self Care Deficits Care Plan (Adult)  Goal: *Acute Goals and Plan of Care (Insert Text)  Initial OT STGs (4/5/2017-4/11/2017). Within 7 days:    1. Patient will perform toilet transfer with supervision in preparation for bowel and bladder management. 2. Patient will perform bowel and bladder management with supervision for increased independence with ADLs. 3. Patient will perform UB dressing with supervision for increased independence with ADLs. 4. Patient will perform LB dressing with supervision for increased independence with ADLs. 5. Patient will tolerate standing for 8 minutes with supervision in preparation for standing ADLs. 6. Patient will utilize energy conservation techniques with no verbal cues for increased independence with ADLs. Outcome: Progressing Towards Goal  OCCUPATIONAL THERAPY EVALUATION     Patient: Merlinda Ribas (83 y.o. female)  Date: 4/5/2017  Primary Diagnosis: OSTEOARTHRITIS LEFT KNEE,DIABETIC TYPE II,ELEVATED CHOLESTEROL,REFLUX,HX OF DVT  Osteoarthritis of left knee  Procedure(s) (LRB):  LEFT TOTAL KNEE ARTHROPLASTY - SPEC POP, **CONTACT ISOLATION** (Left) 1 Day Post-Op   Precautions: Fall, WBAT      ASSESSMENT :  Based on the objective data described below, the patient presents with deficits in the areas of functional mobility, functional transfers, decreased balance during ADLs, and overall decreased activity tolerance at this time. Patient supine in bed upon start of session with a reported pain level of 5/10. Patient seen with PT. Patient unable to perform SLR; therefore, donned KI. Patient required mod A for bed mobility in order to sit EOB. Patient required mod A x 2 with bed elevated for sit to stand transfer. Patient able to take ~2 steps to [facility] with RW, KI, and mod A x 2 with LLE buckling through [facility] 94. Patient assisted back to bed with all other needs within reach at this time. Recommend rehab upon discharge.      Patient will benefit from skilled intervention to address the above impairments. Patients rehabilitation potential is considered to be Good  Factors which may influence rehabilitation potential include:   [ ]             None noted  [ ]             Mental ability/status  [ ]             Medical condition  [ ]             Home/family situation and support systems  [ ]             Safety awareness  [X]             Pain tolerance/management  [ ]             Other:        PLAN :  Recommendations and Planned Interventions:  [X]               Self Care Training                  [X]        Therapeutic Activities  [X]               Functional Mobility Training    [ ]        Cognitive Retraining  [X]               Therapeutic Exercises           [X]        Endurance Activities  [X]               Balance Training                   [ ]        Neuromuscular Re-Education  [ ]               Visual/Perceptual Training     [X]   Home Safety Training  [X]               Patient Education                 [ ]        Family Training/Education  [ ]               Other (comment):     Frequency/Duration: Patient will be followed by occupational therapy 3-5 times a week to address goals. Discharge Recommendations: Rehab  Further Equipment Recommendations for Discharge: N/A       SUBJECTIVE:   Patient stated Y'all aren't going to let me fall are you?       OBJECTIVE DATA SUMMARY:       Past Medical History:   Diagnosis Date    Arthritis       osteoarthritis    Chronic pain       lower right back and right leg     Embolism and thrombosis of unspecified site 2008     PE    Fibromyalgia      GERD (gastroesophageal reflux disease)      H/O blood clots      High cholesterol      HTN (hypertension)       No meds    Other ill-defined conditions       Hiatal Hernia    PE (pulmonary thromboembolism) ([address] 2008     after right knee surgery    Thromboembolism ([facility] [address] prior to 2006     2 in the left knee     Thromboembolus [facility]) 2006     DVT      Urinary incontinence      UTI (lower urinary tract infection)       Past Surgical History:   Procedure Laterality Date    HX BACK SURGERY   2005    HX CATARACT REMOVAL         OU    HX HYSTERECTOMY   1970's     DEVIN    HX KNEE REPLACEMENT         RT    HX LUMBAR FUSION   2000    HX MOHS PROCEDURES         left    HX OOPHORECTOMY   1970's     one ovary removed    HX ORTHOPAEDIC         right foot bunionectomy and right second toe surgery    HX ORTHOPAEDIC         left heel surgery    HX ORTHOPAEDIC Right 08/05/2013     hip replacement    HX UROLOGICAL   02/28/2013     cyto-flexible cystourethroscope. The urethra was unremarkable. The trigone was normal.     TOTAL KNEE ARTHROPLASTY   2008     right     Barriers to Learning/Limitations: None  Compensate with: visual, verbal, tactile, kinesthetic cues/model  Prior Level of Function/Home Situation: Patient reported that she used a SPC and RW for functional mobility prior to admission, and also required assistance for LB ADLs prior to admission. Home Situation  Home Environment: Private residence  # Steps to Enter: 4  Rails to Enter: Yes  Hand Rails : Bilateral  One/Two Story Residence: One story  Living Alone: No  Support Systems: Family member(s)  Patient Expects to be Discharged to[de-identified] Private residence  Current DME Used/Available at Home: Commode, bedside, Cane, straight, Shower chair, Walker, rolling  Tub or Shower Type: Tub/Shower combination  [ ]  Right hand dominant           [X]  Left hand dominant  Cognitive/Behavioral Status:  Neurologic State: Alert  Orientation Level: Oriented X4  Cognition: Follows commands  Safety/Judgement: Awareness of environment  Vision/Perceptual:    Corrective Lenses: Glasses  Coordination:  Coordination: Within functional limits  Fine Motor Skills-Upper: Left Intact; Right Intact    Gross Motor Skills-Upper: Right Intact; Left Intact  Balance:  Sitting: Intact  Standing: Impaired; With support  Standing - Static: Poor;Constant support  Standing - Dynamic : Poor  Strength:  Strength: Generally decreased, functional  Tone & Sensation:  Tone: Normal  Sensation: Intact  Range of Motion:  AROM: Generally decreased, functional  PROM: Generally decreased, functional  Functional Mobility and Transfers for ADLs:  Bed Mobility:  Supine to Sit: Moderate assistance  Sit to Supine: Moderate assistance  Scooting: Contact guard assistance  Transfers:  Sit to Stand: Moderate assistance;Assist x2  ADL Assessment:  Not assessed this date due to increased pain. ADL Intervention:  Cognitive Retraining  Safety/Judgement: Awareness of environment  Pain:  Pain Scale 1: Numeric (0 - 10)  Pain Intensity 1: 6  Pain Location 1: Knee  Pain Orientation 1: Left  Pain Description 1: Aching  Pain Intervention(s) 1: Medication (see MAR)  Activity Tolerance:   Patient tolerated session with reports of increased pain this date. Please refer to the flowsheet for vital signs taken during this treatment. After treatment:   [ ] Patient left in no apparent distress sitting up in chair  [X] Patient left in no apparent distress in bed  [X] Call bell left within reach  [X] Nursing notified Alba Otero)  [ ] Caregiver present  [ ] Bed alarm activated      COMMUNICATION/EDUCATION:   [X] Home safety education was provided and the patient/caregiver indicated understanding. [X] Patient/family have participated as able in goal setting and plan of care. [X] Patient/family agree to work toward stated goals and plan of care. [ ] Patient understands intent and goals of therapy, but is neutral about his/her participation. [ ] Patient is unable to participate in goal setting and plan of care. Thank you for this referral.  Ning Rasmussen. SHAY PrescottR/L  Time Calculation: 16 mins     G-Codes (GP)  Self Care   Current  CK= 40-59%  [id number] Goal  CI= 1-19%  The severity rating is based on the Level of Assistance required for Functional Mobility and ADLs.

## 2017-04-05 NOTE — PROGRESS NOTES
0005-Resting in bed, eyes closed, arouses with moderate effort. Stable. Ace wrap, left lower extremity, thigh to toes, intact, no drainage. Hemo vac in place; patent, draining. On Q Pump in place with clamps to tubing open, dial set at 6 mL, and left femoral dressing ,intact, no drainage. Denies numbness and tingling to extremities, but states \"I can't move this left leg\". Patient not able to move left leg independently without assistance. Positive sensation, movement of foot and toes, capillary refill, warm, dorsalis pedis pulse, and no complaints of calf pain to left lower extremity. Ice packs in place. Incentive spirometer in use. Call light and personal items within reach. Assessment complete. 0416-No change from initial assessment. Stable. 0559-Chlorhexidine wipes done. Stable. Resting quietly in bed. Shift Summary:  Uneventful shift. Stable. Left leg weakness. On Q Pump turned down to 4 mL.

## 2017-04-05 NOTE — PROGRESS NOTES
conducted a visit with Elinor Alvarado, who is a 78 y.o.,female. The  provided the following Interventions:  Initiated a relationship of care and support. Offered prayer and assurance of continued prayers on patient's behalf. Plan:  Chaplains will continue to follow and will provide pastoral care on an as needed/requested basis.  recommends bedside caregivers page  on duty if patient shows signs of acute spiritual or emotional distress. Rev. ASUNCION MustafaDiv. Spiritual Care Dept.   [phone]

## 2017-04-05 NOTE — ROUTINE PROCESS
Bedside and Verbal shift change report given to SUJATA Eduardo RN (oncoming nurse) by Lissa Abreu. Mesha Sneed RN (offgoing nurse). Report included the following information SBAR, Kardex, Intake/Output, MAR and Recent Results.

## 2017-04-05 NOTE — PROGRESS NOTES
Pharmacy Dosing Services: Warfarin    Consult for Warfarin Dosing by Pharmacy by Dr. Toya Gale provided for this 78 y.o.  female , for indication of DVT / history of PE. Dose to achieve an INR goal of 2-3    Order entered for Warfarin 5 mg to be given today at 18:00. Patient also has order for Enoxaparin 90 mg SQ q12h    Significant drug interactions:   LABS    PT/INR Lab Results   Component Value Date/Time    INR 1.1 04/05/2017 02:50 AM      Platelets Lab Results   Component Value Date/Time    PLATELET 366 68/54/4186 03:08 PM      H/H     Lab Results   Component Value Date/Time    HGB 8.9 04/05/2017 12:37 PM        Warfarin Administrations (last 168 hours)       Date/Time Action Medication Dose      04/04/17 1939 Given    warfarin (COUMADIN) tablet 5 mg 5 mg          Pharmacy to follow daily and will provide subsequent Warfarin dosing based on clinical status.   Shabana Torre, [address]  Contact information [phone]

## 2017-04-05 NOTE — PROGRESS NOTES
Met with pt at bedside. Pt states she anticipates discharge home with son available to assist this week and caregiver staying with her next week. FOC offered and pt chose [facility]  1852. Pt has CPM ordered through [facility] [place] [facility] [phone], pt aware she will need to call for delivery once home. Pt has RW for home. 1400- met with pt at bedside after reviewing therapy recs. Pt states she has, \"personal reasons\" for not wanting to go to SNF, has been to SNF in the past; pt refuses SNF at this time. Pt has 24hr caregiver in place, has son this week to assist, lives in one floor home, has toilets that are comfort height and bathroom accessibility. Care Management Interventions  PCP Verified by CM:  Yes  Transition of Care Consult (CM Consult): [address]: No  Reason Outside [place]: Physician referred to specific agency ([facility])  Discharge Durable Medical Equipment: No (pt has RW)  Physical Therapy Consult: Yes  Occupational Therapy Consult: Yes  Current Support Network: Own Home, Family Lives Nearby  Confirm Follow Up Transport: Family  Plan discussed with Pt/Family/Caregiver: Yes  Freedom of Choice Offered: Yes  Discharge Location  Discharge Placement: Home with home health

## 2017-04-05 NOTE — PROGRESS NOTES
Problem: Mobility Impaired (Adult and Pediatric)  Goal: *Acute Goals and Plan of Care (Insert Text)  In 1-7 days pt will be able to perform:  ST. Bed mobility: Rolling L to R to L modified independent for positioning. 2. Supine to sit to supine S with HR for meals. 3. Sit to stand to sit S with RW in prep for ambulation. LT. Gait: Ambulate >150ft S with RW, WBAT, for home/community mobility. 2. Stair Negotiation: Ascend/descend >2 steps CGA with HR for home entry. 3. Activity tolerance: Tolerate up in chair 1-2 hours for ADLs. 4. Patient/Family Education: Patient/family to be independent with HEP for follow-up care and safe discharge. Outcome: Progressing Towards Goal  PHYSICAL THERAPY TREATMENT     Patient: Caridad Qiu (89 y.o. female)  Date: 2017  Diagnosis: OSTEOARTHRITIS LEFT KNEE,DIABETIC TYPE II,ELEVATED CHOLESTEROL,REFLUX,HX OF DVT  Osteoarthritis of left knee Osteoarthritis of left knee  Procedure(s) (LRB):  LEFT TOTAL KNEE ARTHROPLASTY - SPEC POP, **CONTACT ISOLATION** (Left) 1 Day Post-Op  Precautions: Fall, WBAT   Chart, physical therapy assessment, plan of care and goals were reviewed. ASSESSMENT:  Patient was seen with OT this date. The patient is demonstrating limited progress with therapy due to lack of left quadriceps strength. During transfer and gait training, the patient was unable to take steps without buckling at the left knee despite wearing the knee immobilizer. Patient is unable to fully offload her weight using her upper extremities to prevent buckling. Returned the patient to seated at edge of bed, noted patient exhibiting mild tremors. Patient requested to return back to bed as a result. Nursing informed. Will continue PT this afternoon and progress ambulatory mobility as appropriate. Recommend rehab at this time.   Progression toward goals:  [ ]      Improving appropriately and progressing toward goals  [X]      Improving slowly and progressing toward goals  [ ]      Not making progress toward goals and plan of care will be adjusted       PLAN:  Patient continues to benefit from skilled intervention to address the above impairments. Continue treatment per established plan of care. Discharge Recommendations:  Rehab  Further Equipment Recommendations for Discharge:  N/A       SUBJECTIVE:   Patient stated I feel okay.       OBJECTIVE DATA SUMMARY:   Critical Behavior:  Neurologic State: Alert  Orientation Level: Oriented X4  Cognition: Follows commands  Safety/Judgement: Awareness of environment  Functional Mobility Training:  Bed Mobility:  Supine to Sit: Moderate assistance  Sit to Supine: Moderate assistance  Scooting: Contact guard assistance  Transfers:  Sit to Stand: Moderate assistance;Assist x2  Stand to Sit: Moderate assistance;Assist x2  Balance:  Sitting: Intact  Standing: Impaired; With support  Standing - Static: Poor;Constant support  Standing - Dynamic : Poor  Ambulation/Gait Training:  Distance (ft): 1 Feet (ft)  Assistive Device: Gait belt;Walker, rolling  Ambulation - Level of Assistance: Moderate assistance;Assist x2  Gait Abnormalities: Decreased step clearance (Buckling on LLE)  Left Side Weight Bearing: As tolerated  Base of Support: Shift to right  Stance: Left decreased  Speed/Anh: Slow  Step Length: Right shortened;Left shortened  Swing Pattern: Right asymmetrical;Left asymmetrical  Interventions: Safety awareness training;Verbal cues     Pain:  Pain Scale 1: Numeric (0 - 10)  Pain Intensity 1: 6  Pain Location 1: Knee  Pain Orientation 1: Left  Pain Description 1: Aching  Pain Intervention(s) 1: Medication (see MAR)  Activity Tolerance:   Poor  Please refer to the flowsheet for vital signs taken during this treatment.   After treatment:   [ ] Patient left in no apparent distress sitting up in chair  [X] Patient left in no apparent distress in bed  [X] Call bell left within reach  [X] Nursing notified  [ ] Caregiver present  [ ] Bed alarm activated      Angela Holloway   Time Calculation: 17 mins

## 2017-04-05 NOTE — PROGRESS NOTES
Problem: Mobility Impaired (Adult and Pediatric)  Goal: *Acute Goals and Plan of Care (Insert Text)  In 1-7 days pt will be able to perform:  ST. Bed mobility: Rolling L to R to L modified independent for positioning. 2. Supine to sit to supine S with HR for meals. 3. Sit to stand to sit S with RW in prep for ambulation. LT. Gait: Ambulate >150ft S with RW, WBAT, for home/community mobility. 2. Stair Negotiation: Ascend/descend >2 steps CGA with HR for home entry. 3. Activity tolerance: Tolerate up in chair 1-2 hours for ADLs. 4. Patient/Family Education: Patient/family to be independent with HEP for follow-up care and safe discharge. Outcome: Not Progressing Towards Goal  PHYSICAL THERAPY TREATMENT     Patient: Louise Garcia (38 y.o. female)  Date: 2017  Diagnosis: OSTEOARTHRITIS LEFT KNEE,DIABETIC TYPE II,ELEVATED CHOLESTEROL,REFLUX,HX OF DVT  Osteoarthritis of left knee Osteoarthritis of left knee  Procedure(s) (LRB):  LEFT TOTAL KNEE ARTHROPLASTY - SPEC POP, **CONTACT ISOLATION** (Left) 1 Day Post-Op  Precautions: Fall, WBAT   Chart, physical therapy assessment, plan of care and goals were reviewed. ASSESSMENT:  The patient demonstrates slightly improved left quadriceps strength this session but continues to have significant weakness. Patient was assisted to seated at edge of bed with moderate/maximal assistance. Upon sitting the patient reported increased knee pain and adamantly requested to return back to supine in bed. Attempted to encourage the patient to sit up at edge of bed, however patient continued to refuse due to pain. Returned the patient to supine in bed. Performed supine exercises, however the patient also did not tolerate this well. Patient will benefit from rehab placement, as she is currently requires too much assistance to perform transfers and ambulate.    Progression toward goals:  [ ]      Improving appropriately and progressing toward goals  [ ]      Improving slowly and progressing toward goals  [X]      Not making progress toward goals and plan of care will be adjusted       PLAN:  Patient continues to benefit from skilled intervention to address the above impairments. Continue treatment per established plan of care. Discharge Recommendations:  Rehab  Further Equipment Recommendations for Discharge:  N/A       SUBJECTIVE:   Patient stated Please let me lay back down.       OBJECTIVE DATA SUMMARY:   Critical Behavior:  Neurologic State: Alert  Orientation Level: Oriented X4  Cognition: Follows commands  Safety/Judgement: Awareness of environment  Functional Mobility Training:  Bed Mobility:  Supine to Sit: Moderate assistance;Maximum assistance  Sit to Supine: Moderate assistance;Maximum assistance  Scooting: Maximum assistance  Transfers:  Sit to Stand:  (N/A)  Stand to Sit:  (N/A)  Balance:  Sitting: Impaired  Sitting - Static: Fair (occasional)  Sitting - Dynamic: Poor (constant support)  Standing: Impaired; With support  Standing - Static: Poor;Constant support  Standing - Dynamic : Poor     Therapeutic Exercises:   Performed supine heel slides 1x10 and quad sets 1x10. Pain:  Pain Scale 1: Numeric (0 - 10)  Pain Intensity 1: 6  Pain Location 1: Knee  Pain Orientation 1: Left  Pain Description 1: Aching  Pain Intervention(s) 1: Medication (see MAR)  Activity Tolerance:   Poor  Please refer to the flowsheet for vital signs taken during this treatment.   After treatment:   [ ] Patient left in no apparent distress sitting up in chair  [X] Patient left in no apparent distress in bed  [X] Call bell left within reach  [X] Nursing notified  [ ] Caregiver present  [ ] Bed alarm activated      Tamiko Sprinkle   Time Calculation: 20 mins

## 2017-04-05 NOTE — PROGRESS NOTES
Hospitalist Progress Note-critical care note     Patient: Remy Palacios MRN: [id number]  CSN: [id number]    YOB: 1940  Age: 78 y.o. Sex: female    DOA: 4/4/2017 LOS:  LOS: 1 day            Chief complaint: hx of dvt and PE. Anemia , htn , hypomagnesemia     Assessment/Plan         Patient Active Problem List   Diagnosis Code    Urge incontinence N39.41    Dyslipidemia E78.5    Hypertension I10    History of deep venous thrombosis Z86.718    History of pulmonary embolism Z86.711    Osteoarthritis M19.90    Pneumonia J18.9    MRSA nasal colonization Z22.322    CAD (coronary artery disease) I25.10     1. OA sp TKA   continue pain control and Pt/ ot   2. 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   3. CAD with out angina  Stable   4. HTN  Well controlled, continue current meds   5. GERD  ppi   6. Osteoporosis     7 anemia due to acute blood loss   On iron ,will transfuse if h/h <8     8 hypomagnesemia  Mg replacement and continue to monitor      Subjective: pain controlled, no chest pain   Nurse: no acute issue     Addendum :paged due to fever  Tylenol  Prn   Cxr: Left basilar density may represent atelectasis or infiltrate. Right lower  midlung field density likely represents atelectasis or scarring. Widening of the mediastinum may be artifactual given the portable technique. Standard PA and lateral views are suggested to exclude a mediastinal mass. Will have PA /lateral view x-ray and start levaquin for possible aspiration pna   Review of systems:    General: No fevers or chills. Cardiovascular: No chest pain or pressure. No palpitations. Pulmonary: No shortness of breath. Gastrointestinal: No nausea, vomiting.      Vital signs/Intake and Output:  Visit Vitals    /52 (BP 1 Location: Left arm, BP Patient Position: At rest)    Pulse 84    Temp 98.1 °F (36.7 °C)    Resp 16    Ht 5' 3\" (1.6 m)    Wt 87.1 kg (192 lb 1 oz)    SpO2 99%    BMI 34.02 kg/m2     Current Shift:  04/05 0701 - 04/05 1900  In: 360 [P.O.:360]  Out: -   Last three shifts:  04/03 1901 - 04/05 0700  In: 3852.2 [P.O.:900; I.V.:2952.2]  Out: 835 [Urine:675; Drains:110]    Physical Exam:  General: WD, WN. Alert, cooperative, no acute distress    HEENT: NC, Atraumatic. PERRLA, anicteric sclerae. Lungs: CTA Bilaterally. No Wheezing/Rhonchi/Rales. Heart:  Regular  rhythm,  No murmur, No Rubs, No Gallops  Abdomen: Soft, Non distended, Non tender.  +Bowel sounds,   Extremities: No c/c. Left knee covered with dressing ,wound vac noted   Psych:   Not anxious or agitated. Neurologic:  No acute neurological deficit. Labs: Results:       Chemistry Recent Labs      04/05/17   0250   GLU  137*   NA  143   K  3.8   CL  108   CO2  27   BUN  10   CREA  0.74   CA  8.3*   AGAP  8   BUCR  14      CBC w/Diff Recent Labs      04/05/17   0250   HGB  8.2*   HCT  25.5*      Cardiac Enzymes No results for input(s): CPK, CKND1, BRADLEY in the last 72 hours. No lab exists for component: CKRMB, TROIP   Coagulation Recent Labs      04/05/17   0250 04/04/17   1030   PTP  14.0  13.1   INR  1.1  1.0       Lipid Panel No results found for: CHOL, CHOLPOCT, CHOLX, CHLST, CHOLV, [id number], HDL, LDL, NLDLCT, DLDL, LDLC, DLDLP, [id number], VLDLC, VLDL, TGL, TGLX, TRIGL, [id number], TRIGP, TGLPOCT, [id number], CHHD, CHHDX   BNP No results for input(s): BNPP in the last 72 hours. Liver Enzymes No results for input(s): TP, ALB, TBIL, AP, SGOT, GPT in the last 72 hours.     No lab exists for component: DBIL   Thyroid Studies No results found for: T4, T3U, TSH, TSHEXT     Procedures/imaging: see electronic medical records for all procedures/Xrays and details which were not copied into this note but were reviewed prior to creation of Ai Lira MD

## 2017-04-05 NOTE — DISCHARGE INSTRUCTIONS
Dr. Brittany Jackson Post-Operative Instructions Total Knee Replacement    ACTIVITIES :  1. You may be up and walking about the house with your walker. 2.  Activities around the house, such as washing dishes, fixing light meals, and your own personal care are fine. 3.  Avoid strenuous activities, such as vacuuming, lifting laundry or grocery bags. 4.  Walking is the best way to rebuild strength and stamina. Start SLOWLY and gradually increase your distance. 5.  Avoid any jogging, running or excessive stair-climbing   6. Your home physical therapist will work with you and your range-of-motion for the first 7-14 days. After your first visit with Dr. Ge you will be scheduled for out-patient physical therapy at a site convenient for you. 7.  Follow-up with Dr. Ge in 10-14 days. BATHING and INCISION CARE:  1. The incision may be tender to touch or feel numb: this is normal.   2.  Keep the incision clean and dry no showering until your follow-up appointment. The incision will be closed with sutures under the skin. 3.  Do not apply any lotions, ointments or oils on the incision. 4.  If you notice any excessive swelling, redness, or persistent drainage around the incision, notify the office immediately. DRIVIN. You should not drive until after your follow-up appointment. 2.  You can be in a vehicle for short distances, but if you travel any long distance, please stop about every 30 minutes and walk/stretch. 3.  You should NEVER drive while taking narcotic medication. 4.  Driving will be permitted on right knee replacements after the therapist has confirmed a range-of-motion of a 105 degrees. Left knee replacements may drive at 2 weeks post-op. RETURN TO WORK :  1. The decision to return to work will be determined on an individual basis. 2.  Many people who have a strenuous job (construction, heavy labor, etc) may need to be off work for up to 12 weeks.    3.  If you need a work note, please let us know as soon as possible, and not the same day you are planning to return to work. NUTRITION :  1.  Good nutrition is an essential part of healing. 2.  You should eat a balanced diet each day, including fruits, vegetables, dairy products and protein. 3.  Remember to drink plenty of water. 4.  If you have not had a bowel movement within 3 days of surgery, you will need to use a laxative or suppository that can be obtained over-the-counter at your local pharmacy. MEDICATIONS -  1. You may resume the medications you were taking before surgery. 2.  You will receive a prescription for pain medication at discharge from the hospital. The pain medication works best if taken before the pain becomes severe. 3.  To reduce stomach upset, always take the medication with food. 4.  Begin to wean yourself off the pain medication during the second week after discharge. 5.  If you need a refill, please call the office during working hours at least 2 days before your prescription runs out. Do not wait until your bottle is empty to call for a refill. 6.  You will also be prescribed a blood thinner that you will take by mouth for 10-14 days post-operatively. 7.  DO NOT drive if you are taking narcotic pain medications. HOME HEALTH CARE:  1.   A home health care service has been set-up for you to help assist you once you leave the hospital.  2.  They will contact you either before you leave the hospital or within 24 hours once you have been discharged home. 3. A nurse will assist you with your dressing changes and a Physical Therapist with help you with your therapy needs. 4.  A CPM machine will be delivered to your home. The CPM machine will begin at 0-70 degrees at home and you can add 5 degrees to your range-of-motion each day as tolerated. To a maximum of 120 degrees.      CALL THE OFFICE:   If you have severe pain unrelieved by the medications;   If you have a fever of 101.0°F or greater;    If you notice excessive swelling, redness, or persistent drainage from the incision or IV site; [facility] office number is [phone] from 8:00am to 5:00pm Monday through Friday. After 5:00pm, on weekends, or holidays, please leave a message with our answering service and the doctor on-call will get back to you shortly. Lab Results   Component Value Date/Time    Hemoglobin A1c 6.4 03/14/2017 03:08 PM       This lab test reflects that your blood sugar has been slightly elevated over the past 3 months and should be evaluated by your primary care provider. An A1C of 5.7-6.4% meets the criteria for pre-diabetes; an A1C of 6.5% or higher meets the criteria for diabetes. This lab test reflects that your blood sugar averaged 137 mg/dL over the past 3 months. It is important to follow up with your provider on a routine basis to continue to evaluate your blood sugar and discuss any necessary changes in treatment.

## 2017-04-05 NOTE — PROGRESS NOTES
4969 - Bedside report received from SUJATA Tejeda RN. Patient in bed at this time. Pain 5/10. Plan of care for the day addressed with the patient. 8225 - Patient in bed at this time. A/O x 4. IV to left hand  intact and patent. Plexi foot pumps bilaterally and GENESIS applied bilaterally. ACE dressing to left leg removed. Hemovac removed and Mepilex applied. Dressing remains CDI. OnQ clamped. Denies numbness/tingling. Pedal pulses palpable. Non-pitting edema to bilateral lower legs Lungs clear. Bowel sounds active to all quadrants. Patient able to get to 1500 on the incentive spirometer. Pain 6/10, PRN pain medication administered at this time. 1217 - Patient complains of shivering however reports that she is not cold. Blank provided and heat adjusted however spoke with Dr. Aubrie Katz. Orders placed for stat H&H and Mg.     1226 - Phlebotomy notified of stat orders. Spoke with Pharmacy concerning the patient's concerns of her MD ordering lovenox 100mg daily x7 days before and after surgery, however Rx for discharge is lovenox 30mg Q12H for 7 days. Discussed with discharge pharmacist who states that this is appropriate for the patient being that it is preventative. 1330 - OnQ removed at this time. Patient tolerated well. Encouraged patient to get up to chair at this time. Patient refuses at this time. Educated patient on the need for ambulation especially with her hx of blood clots. Patient refuses at this time. 1502 - Pain 6/10. PRN diludid pain medication administered at this time. Patient has been educated on side effects. 1543 - Bedside and Verbal shift change report given to Vladimir Jovel RN by Whit Romero RN. Report included the following information SBAR, Kardex, OR Summary, Intake/Output and MAR. Patient with temp of 101.1. Encouraged IS. Dr. Aubrie Katz paged. 113 Jonas Romero with Dr. Aubrie Katz. Informed of patient's temp.  Updated that patient's shivering has resolved and magnesium was started once provided by pharmacy. Reminded MD that patient has a strong blood clotting history. Orders placed by MD for PRN tylenol, labs for the AM, urine, and increase lovenox.

## 2017-04-05 NOTE — ROUTINE PROCESS
Bedside and Verbal shift change report given to Karla Mariee RN  (oncoming nurse) by Paula Al RN (offgoing nurse). Report included the following information SBAR, Kardex, OR Summary, Procedure Summary, Intake/Output, MAR, Accordion, Recent Results and Med Rec Status Options for questions were provided to the oncoming RN as well as the patient.

## 2017-04-05 NOTE — ROUTINE PROCESS
Bedside and Verbal shift change report given to Osmar Hayes RN (oncoming nurse) by Barry Odom RN (offgoing nurse). Report included the following information SBAR and Kardex.

## 2017-04-05 NOTE — PROGRESS NOTES
1928 Assumed care of patient from Maria Guadalupe Ovalles RN. Shift assessment initiated. Pain voiced at 4/10, will medicate per [facility]. All questions and concerns answered. All needs met. ICS encouraged. Bed in the lowest position. Call bell and phone within reach. No signs of distress noted. Will continue to monitor for changes. 2034 Assessment complete. Scheduled medications given. Pain voiced, medicated per MAR. Repositioned in bed. Voiding on bed pan. No further needs at this time. Bedside and Verbal shift change report given to Laci Knowles RN (oncoming nurse) by REYMUNDO Kasper RN (offgoing nurse). Report included the following information SBAR, Kardex, MAR, Recent Results and Med Rec Status.

## 2017-04-05 NOTE — PROGRESS NOTES
Progress Note POD #1      Patient: Elinor Alvarado               Sex: female          DOA: 4/4/2017         YOB: 1940      Surgery: Procedure(s):  LEFT TOTAL KNEE ARTHROPLASTY - SPEC POP, **CONTACT ISOLATION**           LOS: 1 day               Subjective:     No new complaints. Doing well. Comfortable. Objective:      Visit Vitals    /52 (BP 1 Location: Left arm, BP Patient Position: At rest)    Pulse 84    Temp 98.1 °F (36.7 °C)    Resp 16    Ht 5' 3\" (1.6 m)    Wt 87.1 kg (192 lb 1 oz)    SpO2 99%    BMI 34.02 kg/m2       Physical Exam:  Neurological: Dressing C/D/I.                            sensation: intact to light touch. No calf pain to palpation. Patient mobility  Bed Mobility Training  Supine to Sit: Minimum assistance (vc)  Sit to Supine: Minimum assistance (vc)  Scooting: Minimum assistance (vc)  Transfer Training  Sit to Stand: Moderate assistance, Assist x2 (vc)  Stand to Sit: Moderate assistance, Assist x2 (vc)      Gait Training  Assistive Device: Walker, rolling, Gait belt, Brace/Splint  Ambulation - Level of Assistance: Moderate assistance, Assist x2  Distance (ft): 1 Feet (ft)  Interventions: Safety awareness training, Verbal cues   Weight Bearing Status  Left Side Weight Bearing: As tolerated        Intake and Output:  Current Shift:     Last three shifts:  04/03 1901 - 04/05 0700  In: 3852.2 [P.O.:900;  I.V.:2952.2]  Out: 835 [Urine:675; Drains:110]    Lab/Data Reviewed:  Lab Results   Component Value Date/Time    WBC 6.9 03/14/2017 03:08 PM    HGB 8.2 04/05/2017 02:50 AM    HCT 25.5 04/05/2017 02:50 AM    PLATELET 594 94/56/6141 03:08 PM    MCV 89.6 03/14/2017 03:08 PM     Lab Results   Component Value Date/Time    aPTT 29.2 08/05/2013 06:00 AM     Lab Results   Component Value Date/Time    INR 1.1 04/05/2017 02:50 AM    INR 1.0 04/04/2017 10:30 AM    INR 1.2 08/07/2013 02:53 AM    Prothrombin time 14.0 04/05/2017 02:50 AM    Prothrombin time 13.1 04/04/2017 10:30 AM    Prothrombin time 14.4 08/07/2013 02:53 AM          Assessment/Plan     Principal Problem:    Osteoarthritis of left knee (3/22/2017)    Active Problems:    MRSA nasal colonization (3/15/2017)      CAD (coronary artery disease) (4/4/2017)        1. Stable  2. OOB with PT  3. D/C Planning  4. Continue to Wean off On Q pump. 5. D/C drain & change dressing prior to discharge home. 6.Discharge to home after being cleared by P.T  7. Mild acute blood loss anemia,asymtpomatic. Will conitnue to monitor. 8. Coumadin & Lovenox today.

## 2017-04-05 NOTE — PROGRESS NOTES
1600 Assumed care of pt, pt has a elevated temp at this time, previous primary nurse to call MD, PRN tylenol and other tests at this time. 1817 Pt off the floor for chest xray, Dr. Richa Frank requested additional films from the portable, explained to pt. 1916 Pt is seen back in the room at this time. Shift summary: Patient worked with PT and is having trouble with ambulation with assistance. Pain remained well-controlled with medication. Issues/concerns tjo be reported to next shift.

## 2017-04-06 PROBLEM — J69.0 ASPIRATION PNEUMONIA (HCC): Status: ACTIVE | Noted: 2017-04-06

## 2017-04-06 PROBLEM — A41.9 SEPSIS, UNSPECIFIED: Status: ACTIVE | Noted: 2017-04-06

## 2017-04-06 LAB
APPEARANCE UR: CLEAR
BACTERIA URNS QL MICRO: NEGATIVE /HPF
BASOPHILS # BLD AUTO: 0 K/UL (ref 0–0.1)
BASOPHILS # BLD: 0 % (ref 0–3)
BILIRUB UR QL: NEGATIVE
COLOR UR: YELLOW
DIFFERENTIAL METHOD BLD: ABNORMAL
EOSINOPHIL # BLD: 0.1 K/UL (ref 0–0.4)
EOSINOPHIL NFR BLD: 1 % (ref 0–5)
EPITH CASTS URNS QL MICRO: NORMAL /LPF (ref 0–5)
ERYTHROCYTE [DISTWIDTH] IN BLOOD BY AUTOMATED COUNT: 16.2 % (ref 11.6–14.5)
GLUCOSE UR STRIP.AUTO-MCNC: NEGATIVE MG/DL
HCT VFR BLD AUTO: 25.5 % (ref 35–45)
HGB BLD-MCNC: 8.1 G/DL (ref 12–16)
HGB UR QL STRIP: ABNORMAL
INR PPP: 1.3 (ref 0.8–1.2)
KETONES UR QL STRIP.AUTO: NEGATIVE MG/DL
LACTATE SERPL-SCNC: 0.8 MMOL/L (ref 0.4–2)
LACTATE SERPL-SCNC: 2.4 MMOL/L (ref 0.4–2)
LEUKOCYTE ESTERASE UR QL STRIP.AUTO: NEGATIVE
LYMPHOCYTES # BLD AUTO: 25 % (ref 20–51)
LYMPHOCYTES # BLD: 2.4 K/UL (ref 0.8–3.5)
MAGNESIUM SERPL-MCNC: 2.1 MG/DL (ref 1.6–2.6)
MCH RBC QN AUTO: 28.4 PG (ref 24–34)
MCHC RBC AUTO-ENTMCNC: 31.8 G/DL (ref 31–37)
MCV RBC AUTO: 89.5 FL (ref 74–97)
MONOCYTES # BLD: 1.1 K/UL (ref 0–1)
MONOCYTES NFR BLD AUTO: 11 % (ref 2–9)
MUCOUS THREADS URNS QL MICRO: NEGATIVE /LPF
NEUTS BAND NFR BLD MANUAL: 3 % (ref 0–5)
NEUTS SEG # BLD: 5.8 K/UL (ref 1.8–8)
NEUTS SEG NFR BLD AUTO: 60 % (ref 42–75)
NITRITE UR QL STRIP.AUTO: NEGATIVE
PH UR STRIP: 6.5 [PH] (ref 5–8)
PLATELET # BLD AUTO: 221 K/UL (ref 135–420)
PMV BLD AUTO: 9.1 FL (ref 9.2–11.8)
POTASSIUM SERPL-SCNC: 4.1 MMOL/L (ref 3.5–5.5)
PROT UR STRIP-MCNC: NEGATIVE MG/DL
PROTHROMBIN TIME: 15.4 SEC (ref 11.5–15.2)
RBC # BLD AUTO: 2.85 M/UL (ref 4.2–5.3)
RBC #/AREA URNS HPF: NORMAL /HPF (ref 0–5)
RBC MORPH BLD: ABNORMAL
SP GR UR REFRACTOMETRY: 1.01 (ref 1–1.03)
UROBILINOGEN UR QL STRIP.AUTO: 1 EU/DL (ref 0.2–1)
WBC # BLD AUTO: 9.7 K/UL (ref 4.6–13.2)
WBC URNS QL MICRO: NEGATIVE /HPF (ref 0–5)

## 2017-04-06 PROCEDURE — 97530 THERAPEUTIC ACTIVITIES: CPT

## 2017-04-06 PROCEDURE — 85610 PROTHROMBIN TIME: CPT | Performed by: PHYSICIAN ASSISTANT

## 2017-04-06 PROCEDURE — 74011250636 HC RX REV CODE- 250/636: Performed by: HOSPITALIST

## 2017-04-06 PROCEDURE — 74011250637 HC RX REV CODE- 250/637: Performed by: HOSPITALIST

## 2017-04-06 PROCEDURE — 74011250636 HC RX REV CODE- 250/636: Performed by: ORTHOPAEDIC SURGERY

## 2017-04-06 PROCEDURE — 74011250637 HC RX REV CODE- 250/637: Performed by: PHYSICIAN ASSISTANT

## 2017-04-06 PROCEDURE — 87040 BLOOD CULTURE FOR BACTERIA: CPT | Performed by: HOSPITALIST

## 2017-04-06 PROCEDURE — 74011000258 HC RX REV CODE- 258: Performed by: HOSPITALIST

## 2017-04-06 PROCEDURE — 74011000250 HC RX REV CODE- 250: Performed by: PHYSICIAN ASSISTANT

## 2017-04-06 PROCEDURE — 74011000250 HC RX REV CODE- 250: Performed by: HOSPITALIST

## 2017-04-06 PROCEDURE — 85025 COMPLETE CBC W/AUTO DIFF WBC: CPT | Performed by: PHYSICIAN ASSISTANT

## 2017-04-06 PROCEDURE — 65660000000 HC RM CCU STEPDOWN

## 2017-04-06 PROCEDURE — 83605 ASSAY OF LACTIC ACID: CPT | Performed by: HOSPITALIST

## 2017-04-06 PROCEDURE — 83735 ASSAY OF MAGNESIUM: CPT | Performed by: PHYSICIAN ASSISTANT

## 2017-04-06 PROCEDURE — 84132 ASSAY OF SERUM POTASSIUM: CPT | Performed by: HOSPITALIST

## 2017-04-06 PROCEDURE — 81001 URINALYSIS AUTO W/SCOPE: CPT | Performed by: HOSPITALIST

## 2017-04-06 PROCEDURE — 74011250637 HC RX REV CODE- 250/637: Performed by: INTERNAL MEDICINE

## 2017-04-06 PROCEDURE — 36415 COLL VENOUS BLD VENIPUNCTURE: CPT | Performed by: PHYSICIAN ASSISTANT

## 2017-04-06 RX ORDER — MORPHINE SULFATE 15 MG/1
15 TABLET, FILM COATED, EXTENDED RELEASE ORAL EVERY 12 HOURS
Status: DISCONTINUED | OUTPATIENT
Start: 2017-04-06 | End: 2017-04-08 | Stop reason: HOSPADM

## 2017-04-06 RX ORDER — HYDROMORPHONE HYDROCHLORIDE 1 MG/ML
1 INJECTION, SOLUTION INTRAMUSCULAR; INTRAVENOUS; SUBCUTANEOUS ONCE
Status: COMPLETED | OUTPATIENT
Start: 2017-04-06 | End: 2017-04-06

## 2017-04-06 RX ORDER — SODIUM CHLORIDE 9 MG/ML
100 INJECTION, SOLUTION INTRAVENOUS CONTINUOUS
Status: DISCONTINUED | OUTPATIENT
Start: 2017-04-06 | End: 2017-04-06

## 2017-04-06 RX ORDER — MORPHINE SULFATE 15 MG/1
15 TABLET, FILM COATED, EXTENDED RELEASE ORAL EVERY 12 HOURS
Qty: 30 TAB | Refills: 0 | Status: SHIPPED | OUTPATIENT
Start: 2017-04-06 | End: 2017-04-21

## 2017-04-06 RX ORDER — WARFARIN SODIUM 5 MG/1
5 TABLET ORAL ONCE
Status: COMPLETED | OUTPATIENT
Start: 2017-04-06 | End: 2017-04-06

## 2017-04-06 RX ADMIN — MORPHINE SULFATE 15 MG: 15 TABLET, EXTENDED RELEASE ORAL at 21:23

## 2017-04-06 RX ADMIN — DOCUSATE SODIUM 100 MG: 100 CAPSULE, LIQUID FILLED ORAL at 08:28

## 2017-04-06 RX ADMIN — HYDROMORPHONE HYDROCHLORIDE 4 MG: 4 TABLET ORAL at 11:11

## 2017-04-06 RX ADMIN — FERROUS SULFATE TAB 325 MG (65 MG ELEMENTAL FE) 325 MG: 325 (65 FE) TAB at 16:59

## 2017-04-06 RX ADMIN — Medication 10 ML: at 14:00

## 2017-04-06 RX ADMIN — POTASSIUM CHLORIDE 20 MEQ: 20 TABLET, EXTENDED RELEASE ORAL at 08:28

## 2017-04-06 RX ADMIN — HYDROCHLOROTHIAZIDE 12.5 MG: 25 TABLET ORAL at 08:28

## 2017-04-06 RX ADMIN — HYDROMORPHONE HYDROCHLORIDE 1 MG: 1 INJECTION, SOLUTION INTRAMUSCULAR; INTRAVENOUS; SUBCUTANEOUS at 08:27

## 2017-04-06 RX ADMIN — CYCLOSPORINE 1 DROP: 0.5 EMULSION OPHTHALMIC at 09:00

## 2017-04-06 RX ADMIN — ENOXAPARIN SODIUM 90 MG: 100 INJECTION SUBCUTANEOUS at 08:28

## 2017-04-06 RX ADMIN — SODIUM CHLORIDE 1000 ML: 900 INJECTION, SOLUTION INTRAVENOUS at 17:36

## 2017-04-06 RX ADMIN — SODIUM CHLORIDE 500 ML: 900 INJECTION, SOLUTION INTRAVENOUS at 18:54

## 2017-04-06 RX ADMIN — VANCOMYCIN HYDROCHLORIDE 1250 MG: 10 INJECTION, POWDER, LYOPHILIZED, FOR SOLUTION INTRAVENOUS at 18:11

## 2017-04-06 RX ADMIN — SODIUM CHLORIDE 100 ML/HR: 900 INJECTION, SOLUTION INTRAVENOUS at 18:53

## 2017-04-06 RX ADMIN — ENOXAPARIN SODIUM 90 MG: 100 INJECTION SUBCUTANEOUS at 21:23

## 2017-04-06 RX ADMIN — POTASSIUM CHLORIDE 20 MEQ: 20 TABLET, EXTENDED RELEASE ORAL at 21:23

## 2017-04-06 RX ADMIN — SUCRALFATE 1 G: 1 SUSPENSION ORAL at 21:23

## 2017-04-06 RX ADMIN — MORPHINE SULFATE 15 MG: 15 TABLET, EXTENDED RELEASE ORAL at 08:28

## 2017-04-06 RX ADMIN — HYDROMORPHONE HYDROCHLORIDE 2 MG: 2 TABLET ORAL at 18:48

## 2017-04-06 RX ADMIN — HYDROMORPHONE HYDROCHLORIDE 2 MG: 2 TABLET ORAL at 00:15

## 2017-04-06 RX ADMIN — PANTOPRAZOLE SODIUM 40 MG: 40 TABLET, DELAYED RELEASE ORAL at 08:28

## 2017-04-06 RX ADMIN — FERROUS SULFATE TAB 325 MG (65 MG ELEMENTAL FE) 325 MG: 325 (65 FE) TAB at 08:28

## 2017-04-06 RX ADMIN — ACETAMINOPHEN 650 MG: 325 TABLET, FILM COATED ORAL at 15:08

## 2017-04-06 RX ADMIN — HYDROMORPHONE HYDROCHLORIDE 4 MG: 4 TABLET ORAL at 15:08

## 2017-04-06 RX ADMIN — SODIUM CHLORIDE 1000 ML: 900 INJECTION, SOLUTION INTRAVENOUS at 18:49

## 2017-04-06 RX ADMIN — DOCUSATE SODIUM 100 MG: 100 CAPSULE, LIQUID FILLED ORAL at 21:23

## 2017-04-06 RX ADMIN — Medication 400 MG: at 21:23

## 2017-04-06 RX ADMIN — AZTREONAM 1 G: 1 INJECTION, POWDER, LYOPHILIZED, FOR SOLUTION INTRAMUSCULAR; INTRAVENOUS at 16:56

## 2017-04-06 RX ADMIN — SUCRALFATE 1 G: 1 SUSPENSION ORAL at 08:28

## 2017-04-06 RX ADMIN — WARFARIN SODIUM 5 MG: 5 TABLET ORAL at 18:48

## 2017-04-06 RX ADMIN — Medication 400 MG: at 08:28

## 2017-04-06 RX ADMIN — HYDROMORPHONE HYDROCHLORIDE 4 MG: 4 TABLET ORAL at 04:19

## 2017-04-06 NOTE — ROUTINE PROCESS
TRANSFER - IN REPORT:    Verbal report received from [address]. Karen Magallon RN(name) on [employer]  being received from [facility](unit) for routine progression of care      Report consisted of patients Situation, Background, Assessment and   Recommendations(SBAR). Information from the following report(s) SBAR, Kardex and MAR was reviewed with the receiving nurse. Opportunity for questions and clarification was provided. Assessment completed upon patients arrival to unit and care assumed.

## 2017-04-06 NOTE — PROGRESS NOTES
2044 - Bedside report received from Francisca Luu RN. Patient in bed at this time. Pain 8/10, ice in place. Plan of care for the day addressed with the patient. Encouraged IS use.    0755 - Informed of temp of 100.2. Encouraged patient to use IS again at this time. Patient reports that she was more concerned about her pain at that time and forgot. 3018 - Patient in bed at this time. A/O x 4. IV to left hand  intact and patent. Plexi foot pumps and TEDs to bilateral legs. Mepilex dressing to left knee with scant old drainage. Denies numbness/tingling. Pedal pulses palpable. Lungs diminished. Bowel sounds active to all quadrants. Patient able to get to 1000 on the incentive spirometer. Pain 9/10, one time dose of IV dilaudid andpain medication administered at this time. 1112 - Temperature has improved from previous. Pain 7/10. PRN pain medication administered at this time. Patient has been educated on side effects. Patient only able to get to 500 on IS. Re-educated and assisted patient with IS. Patient able to get to 1000 after encouragement. Assisted patient be bedside commode at this time. 1150 - Informed by PT that patient was unable to get to side of bed even with max assist.     1330 - Patient in bed resting. Family visiting at bedside. 1500 - Patient assisted to bedpan at this time. [phone] [id number] 11 - Notified by aide that patient's MEWs is a 4 due to temp of 101.6 and HR of 113. Patient pain at this time 7/10. PRN pain medication and tylenol administered at this time. Patient continues to be encouraged to use IS at a minimum of 10x/hr. Patient able to get to 1000 on IS. Patient reports that she is using her IS as instructed. Encouraged patient to get up into chair however patient states that due to her pain she is unable to even get to the side of bed with PT. Encouraged patient that once pain has decreased from this dose of pain medication we will attempt to get up to chair.  Patient voices understanding. 80 - Dr. Julio Cesar Mckeon paged at this time concerning patient's fever, pain and MEWs score of 4.     1524 - Spoke with Dr. Julio Cesar Mckeon and informed of patient's conditions, recent VS and MEWS score of 4. Informed MD of my concerns with the patient's history of blood clots and her recent change in condition. Orders placed for blood cultures and change of ABT. [address] with Supervisor concerning transfer order. Supervisor to obtain room for transfer. 1601 - Informed by PT that patient was capable of standing however vomited and was assisted back to bed. Patient declines antiemetic at this time. Recheck of VS /65, O2 95%, RR 14, , .3 MEWS score of 4. Pain 6/10, ice refreshed and applied to left knee. Lab in room for stat labs at this time. 1656 - Azactam abt started at this time. 1658 - Notified of critical lactic acid of 2.4 from [place]. 1 - Dr. Julio Cesar Mckeon notified of critical lactic acid. MD already aware. Orders placed for fluid bolus. 1705 - Report called to [address] [place] [address]     0204 - Patient assisted to bed pan and then transferred to telemetry unit at this time. Patient reports to nurse that she can not find her phone. Nurse and Aide searched room, belongings and bed at this time. No phone found. Called to the cafe to find out if patient may have left it on the meal tray. Cafe staff states they will go look and call me back. Attempted to call the cell phone using the number provided by the patient and no ringing heard within the room. Patient stated that the ringer may be off. Awaiting returned call from cafe. 1736 - Patient arrives to unit at this time. 1000ml NS bolus started. Oriented to call bell, phone and belongings left within reach.

## 2017-04-06 NOTE — PROGRESS NOTES
Hospitalist Progress Note-critical care note     Patient: Remy Palacios MRN: [id number]  CSN: [id number]    YOB: 1940  Age: 78 y.o. Sex: female    DOA: 4/4/2017 LOS:  LOS: 2 days            Chief complaint: hx of dvt and PE. Anemia , htn , aspiration pna     Assessment/Plan         Patient Active Problem List   Diagnosis Code    Urge incontinence N39.41    Dyslipidemia E78.5    Hypertension I10    History of deep venous thrombosis Z86.718    History of pulmonary embolism Z86.711    Osteoarthritis M19.90    Pneumonia J18.9    MRSA nasal colonization Z22.200    CAD (coronary artery disease) I25.10    Anemia due to acute blood loss D62    Hypomagnesemia E83.42     1. OA sp TKA   continue pain control per primary team and Pt/ ot    2. 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 CAD with out angina  Stable , will switch to cardiac diet   4. HTN  Well controlled, continue current meds   5. GERD  ppi   6. Osteoporosis     7 anemia due to acute blood loss   On iron ,will transfuse if h/h <8     8 hypomagnesemia  Resolved   Mg replacement and continue to monitor    9 aspiration pna and still fever , sepsis with lactic acid 2.4   Change to vanc and azactam, bcx, transfer to [facility] due to high mew score   Nc bolus ,iv fluid ,septic bundle lactic acid Q4hr till <2     PA/LA  X-ray , report pending     Cxr: Left basilar density may represent atelectasis or infiltrate. Right lower  midlung field density likely represents atelectasis or scarring. Widening of the mediastinum may be artifactual given the portable technique. Standard PA and lateral views are suggested to exclude a mediastinal mass. Subjective: had a rough night, pain in the leg, ok to go to rehab    Nurse: pain not controlled     Will dc     Review of systems:    General: No fevers or chills. Cardiovascular: No chest pain or pressure. No palpitations. Pulmonary: No shortness of breath. Gastrointestinal: No nausea, vomiting. Vital signs/Intake and Output:  Visit Vitals    /64 (BP 1 Location: Left arm, BP Patient Position: At rest)    Pulse (!) 109    Temp 100.2 °F (37.9 °C)    Resp 14    Ht 5' 3\" (1.6 m)    Wt 87.1 kg (192 lb 1 oz)    SpO2 95%    BMI 34.02 kg/m2     Current Shift:  04/06 0701 - 04/06 1900  In: -   Out: 100 [Urine:100]  Last three shifts:  04/04 1901 - 04/06 0700  In: 3702.2 [P.O.:1580; I.V.:2122.2]  Out: 7098 [Urine:2725; Drains:110]    Physical Exam:  General: WD, WN. Alert, cooperative, no acute distress    HEENT: NC, Atraumatic. PERRLA, anicteric sclerae. Lungs: CTA Bilaterally. No Wheezing/Rhonchi/Rales. Heart:  Regular  rhythm,  No murmur, No Rubs, No Gallops  Abdomen: Soft, Non distended, Non tender.  +Bowel sounds,   Extremities: No c/c. Left knee covered with dressing , ice pack noted   Psych:   Not anxious or agitated. Neurologic:  No acute neurological deficit. Labs: Results:       Chemistry Recent Labs      04/05/17   0250   GLU  137*   NA  143   K  3.8   CL  108   CO2  27   BUN  10   CREA  0.74   CA  8.3*   AGAP  8   BUCR  14      CBC w/Diff Recent Labs      04/06/17   0155  04/05/17   1237  04/05/17   0250   WBC  9.7   --    --    RBC  2.85*   --    --    HGB  8.1*  8.9*  8.2*   HCT  25.5*  27.7*  25.5*   PLT  221   --    --    GRANS  60   --    --    LYMPH  25   --    --    EOS  1   --    --       Cardiac Enzymes No results for input(s): CPK, CKND1, BRADLEY in the last 72 hours. No lab exists for component: CKRMB, TROIP   Coagulation Recent Labs      04/06/17   0155  04/05/17   0250   PTP  15.4*  14.0   INR  1.3*  1.1       Lipid Panel No results found for: CHOL, CHOLPOCT, CHOLX, CHLST, CHOLV, [id number], HDL, LDL, NLDLCT, DLDL, LDLC, DLDLP, [id number], VLDLC, VLDL, TGL, TGLX, TRIGL, [id number], TRIGP, TGLPOCT, [id number], CHHD, CHHDX   BNP No results for input(s): BNPP in the last 72 hours.    Liver Enzymes No results for input(s): TP, ALB, TBIL, AP, SGOT, GPT in the last 72 hours.     No lab exists for component: DBIL   Thyroid Studies No results found for: T4, T3U, TSH, TSHEXT, TSHEXT     Procedures/imaging: see electronic medical records for all procedures/Xrays and details which were not copied into this note but were reviewed prior to creation of Brunilda Boswell MD

## 2017-04-06 NOTE — PROGRESS NOTES
Problem: Mobility Impaired (Adult and Pediatric)  Goal: *Acute Goals and Plan of Care (Insert Text)  In 1-7 days pt will be able to perform:  ST. Bed mobility: Rolling L to R to L modified independent for positioning. 2. Supine to sit to supine S with HR for meals. 3. Sit to stand to sit S with RW in prep for ambulation. LT. Gait: Ambulate >150ft S with RW, WBAT, for home/community mobility. 2. Stair Negotiation: Ascend/descend >2 steps CGA with HR for home entry. 3. Activity tolerance: Tolerate up in chair 1-2 hours for ADLs. 4. Patient/Family Education: Patient/family to be independent with HEP for follow-up care and safe discharge. Outcome: Not Progressing Towards Goal  PHYSICAL THERAPY TREATMENT     Patient: George Wasserman (27 y.o. female)  Date: 2017  Diagnosis: OSTEOARTHRITIS LEFT KNEE,DIABETIC TYPE II,ELEVATED CHOLESTEROL,REFLUX,HX OF DVT  Osteoarthritis of left knee Osteoarthritis of left knee  Procedure(s) (LRB):  LEFT TOTAL KNEE ARTHROPLASTY - SPEC POP, **CONTACT ISOLATION** (Left) 2 Days Post-Op  Precautions: Fall, WBAT   Chart, physical therapy assessment, plan of care and goals were reviewed. ASSESSMENT:  Patient was seen with OT this date. Patient is not tolerating any activity this morning, despite being given pain medication. Assisted the patient to seated up at edge of bed. Upon sitting the patient cries out in pain and requests to be assisted back to bed. Attempted to encourage the patient to sit up longer but the patient continued to cry in pain. Patient sat up for less than 1 minute before being returned back to bed. Patient yells in pain with any movement involving the left lower extremity. Nursing informed of patient's pain and performance with PT. Will continue PT and progress mobility as tolerated. Rehab is recommended at this time.   Progression toward goals:  [ ]      Improving appropriately and progressing toward goals  [ ]      Improving slowly and progressing toward goals  [X]      Not making progress toward goals and plan of care will be adjusted       PLAN:  Patient continues to benefit from skilled intervention to address the above impairments. Continue treatment per established plan of care. Discharge Recommendations:  Rehab  Further Equipment Recommendations for Discharge:  N/A       SUBJECTIVE:   Patient stated Ranell Mowers please let me lay back down. Please let me lay back down. Please!       OBJECTIVE DATA SUMMARY:   Critical Behavior:  Neurologic State: Alert  Orientation Level: Oriented X4  Cognition: Follows commands  Safety/Judgement: Awareness of environment  Functional Mobility Training:  Bed Mobility:  Rolling: Maximum assistance;Assist x2  Supine to Sit: Assist x2;Maximum assistance  Sit to Supine: Maximum assistance;Assist x2  Scooting: Additional time;Contact guard assistance  Balance:  Sitting: Impaired; With support  Sitting - Static: Fair (occasional)  Sitting - Dynamic: Fair (occasional)     Pain:  Pain Scale 1: Numeric (0 - 10)  Pain Intensity 1: 7  Pain Location 1: Knee  Pain Orientation 1: Left  Pain Description 1: Aching  Pain Intervention(s) 1: Medication (see MAR)  Activity Tolerance:   Poor  Please refer to the flowsheet for vital signs taken during this treatment.   After treatment:   [ ] Patient left in no apparent distress sitting up in chair  [X] Patient left in no apparent distress in bed  [X] Call bell left within reach  [X] Nursing notified  [ ] Caregiver present  [ ] Bed alarm activated      Talita Cortez   Time Calculation: 19 mins

## 2017-04-06 NOTE — PROGRESS NOTES
Chart reviewed and spoke with Pt. Per Pt she now wants rehab placement. FOC offered and she would prefer [facility] [address] as #1 and [facility] as #2. Anticipate dc on Friday if stable. CMS referral placed. Waiting on acceptance. Care Management Interventions  PCP Verified by CM:  Yes  Transition of Care Consult (CM Consult): [address]: No  Reason Outside [place]: Physician referred to specific agency ([facility])  Discharge Durable Medical Equipment: No (pt has RW)  Physical Therapy Consult: Yes  Occupational Therapy Consult: Yes  Current Support Network: Own Home, Family Lives Nearby  Confirm Follow Up Transport: Family  Plan discussed with Pt/Family/Caregiver: Yes  Freedom of Choice Offered: Yes  Discharge Location  Discharge Placement: Home with home health VS SNF

## 2017-04-06 NOTE — PROGRESS NOTES
Pharmacy Dosing Services: Warfarin    Consult for Warfarin Dosing by Pharmacy by Dr. Dias Leader provided for this 78 y.o.  female , for indication of indication of DVT prevention / history of PE. Dose to achieve an INR goal of 2-3    Order entered for  Warfarin 5 mg to be given today at 18:00. Patient also has order for Enoxaparin 90 mg SQ q12h    Significant drug interactions: n/a  LABS    PT/INR Lab Results   Component Value Date/Time    INR 1.3 04/06/2017 01:55 AM      Platelets Lab Results   Component Value Date/Time    PLATELET 948 78/70/9832 01:55 AM      H/H     Lab Results   Component Value Date/Time    HGB 8.1 04/06/2017 01:55 AM        Warfarin Administrations (last 168 hours)       Date/Time Action Medication Dose      04/05/17 1820 Given    warfarin (COUMADIN) tablet 5 mg 5 mg    04/04/17 1939 Given    warfarin (COUMADIN) tablet 5 mg 5 mg          Pharmacy to follow daily and will provide subsequent Warfarin dosing based on clinical status.   SAUL Ji  Contact information [phone]

## 2017-04-06 NOTE — PROGRESS NOTES
Dc plan: SNF on Friday      PT has been accepted to [address] [place] [address] at [address] Gill baird. Nursing to call report [phone]. Pt would like med transport on day of dc and verbalizes she may incur a cost.       Care Management Interventions  PCP Verified by CM:  Yes  Transition of Care Consult (CM Consult): [address]: No  Reason Outside [place]: Physician referred to specific agency ([facility])  Discharge Durable Medical Equipment: No (pt has RW)  Physical Therapy Consult: Yes  Occupational Therapy Consult: Yes  Current Support Network: Own Home, Family Lives Nearby  Confirm Follow Up Transport: Family  Plan discussed with Pt/Family/Caregiver: Yes  Freedom of Choice Offered: Yes  Discharge Location  Discharge Placement: Skilled nursing facility

## 2017-04-06 NOTE — PROGRESS NOTES
Pharmacy Dosing Services: Vancomycin     Consult for Vancomycin Dosing by Pharmacy by Dr. Susanna Peterson provided for this 78y.o. year old female , for indication of aspiration pneumonia, fever. Day of Therapy 1    Ht Readings from Last 1 Encounters:   04/04/17 160 cm (63\")        Wt Readings from Last 1 Encounters:   04/04/17 87.1 kg (192 lb 1 oz)      Other Current Antibiotics Aztreonam 1 gram IV q8h   Significant Cultures pending   Serum Creatinine Lab Results   Component Value Date/Time    Creatinine 0.74 04/05/2017 02:50 AM      Creatinine Clearance Estimated Creatinine Clearance: 69.8 mL/min (based on Cr of 0.74). BUN Lab Results   Component Value Date/Time    BUN 10 04/05/2017 02:50 AM      WBC Lab Results   Component Value Date/Time    WBC 9.7 04/06/2017 01:55 AM      H/H Lab Results   Component Value Date/Time    HGB 8.1 04/06/2017 01:55 AM      Platelets Lab Results   Component Value Date/Time    PLATELET 022 92/51/2874 01:55 AM      Temp (!) 101.3 °F (38.5 °C)       Start Vancomycin therapy, with loading dose of 1250 mg IV once, scheduled for 4/6/17 at 17:00. Follow with maintenance dose of Vancomycin 1000 mg IV every 12 hours, starting 4/7/17 at 05:00. Dose calculated to approximate a therapeutic trough of 15 - 20 mcg/mL. Pharmacy to follow daily and will make changes to dose and/or frequency based on clinical status.   Pharmacist Dawn Calles, [facility]

## 2017-04-06 NOTE — PROGRESS NOTES
Problem: Self Care Deficits Care Plan (Adult)  Goal: *Acute Goals and Plan of Care (Insert Text)  Initial OT STGs (4/5/2017-4/11/2017). Within 7 days:    1. Patient will perform toilet transfer with supervision in preparation for bowel and bladder management. 2. Patient will perform bowel and bladder management with supervision for increased independence with ADLs. 3. Patient will perform UB dressing with supervision for increased independence with ADLs. 4. Patient will perform LB dressing with supervision for increased independence with ADLs. 5. Patient will tolerate standing for 8 minutes with supervision in preparation for standing ADLs. 6. Patient will utilize energy conservation techniques with no verbal cues for increased independence with ADLs. Outcome: Not Progressing Towards Goal  OCCUPATIONAL THERAPY TREATMENT     Patient: Merlinda Ribas (58 y.o. female)  Date: 4/6/2017  Diagnosis: OSTEOARTHRITIS LEFT KNEE,DIABETIC TYPE II,ELEVATED CHOLESTEROL,REFLUX,HX OF DVT  Osteoarthritis of left knee Osteoarthritis of left knee  Procedure(s) (LRB):  LEFT TOTAL KNEE ARTHROPLASTY - SPEC POP, **CONTACT ISOLATION** (Left) 2 Days Post-Op  Precautions: Fall, WBAT  Chart, occupational therapy assessment, plan of care, and goals were reviewed. ASSESSMENT:  Patient supine in bed upon start of session with a reported pain level of 8/10. Patient seen with PT. Patient required max A x 2 for all bed mobility in order to sit EOB. Once sitting up, patient adamantly requesting to lay back down due to increased pain. Patient encouraged to sit up; however, adamant about laying back down. Patient unable to tolerate L knee flexion. Patient returned to supine in bed with all other needs within reach at this time. Strongly recommend rehab upon discharge.   Progression toward goals:  [ ]          Improving appropriately and progressing toward goals  [X]          Improving slowly and progressing toward goals  [ ]          Not making progress toward goals and plan of care will be adjusted       PLAN:  Patient continues to benefit from skilled intervention to address the above impairments. Continue treatment per established plan of care. Discharge Recommendations:  Rehab  Further Equipment Recommendations for Discharge:  N/A       SUBJECTIVE:   Patient stated Please let me lay back down.       OBJECTIVE DATA SUMMARY:   Cognitive/Behavioral Status:  Neurologic State: Alert  Orientation Level: Oriented X4  Cognition: Follows commands  Safety/Judgement: Awareness of environment  Functional Mobility and Transfers for ADLs:              Bed Mobility:  Rolling: Maximum assistance;Assist x2  Supine to Sit: Assist x2;Maximum assistance  Sit to Supine: Maximum assistance;Assist x2  Scooting: Additional time;Contact guard assistance              Transfers:              Not tested this date. Balance:  Sitting: Impaired; With support  Sitting - Static: Fair (occasional)  Sitting - Dynamic: Fair (occasional)  ADL Intervention:  Cognitive Retraining  Safety/Judgement: Awareness of environment  Pain:  Pain Scale 1: Numeric (0 - 10)  Pain Intensity 1: 7  Pain Location 1: Knee  Pain Orientation 1: Left  Pain Description 1: Aching  Pain Intervention(s) 1: Medication (see MAR)  Activity Tolerance:    Patient tolerated session with reports of increased pain this date. Please refer to the flowsheet for vital signs taken during this treatment. After treatment:   [ ]  Patient left in no apparent distress sitting up in chair  [X]  Patient left in no apparent distress in bed  [X]  Call bell left within reach  [X]  Nursing notified Bia Serra)  [ ]  Caregiver present  [ ]  Bed alarm activated     Heidy Prescott OTR/L  Time Calculation: 20 mins

## 2017-04-06 NOTE — PROGRESS NOTES
0012: Assessment completed and documented. Patient alert and oriented x 4, incision to LEFT KNEE. Mepilex dressing clean, dry and intact. Pain 6/10 to left knee on a 0-10/10 numeric scale. Ice packs to site. Patient denies numbness or tingling to bilateral lower and upper extremities. No nausea, no SOB, no distress. 0033: patient medicated for pain per [facility].     0103: Patient assisted on bedpan, patient encouraged to get out of bed, refused at this time. 0400: Patient assisted out of bed to bedside commode with knee immobilizer, 2 person assist. Urine sample taken for UA. CHG wipes done. 6914: After ambulation pain 7/10, medicated with 4 mg Dilaudid per MAR.     0630: patient crying in pain, pain meds not due yet. MD to be notified. 0715: CARMELA Doyle notified of patient's pain not under control, new order given for 1 time dose of Dilaudid 1 mg IV.

## 2017-04-06 NOTE — DISCHARGE SUMMARY
Discharge Summary    Patient: Cony Bush               Sex: female          DOA: 4/4/2017         YOB: 1940      Age:  78 y.o.        LOS:  LOS: 4 days                Admit Date: 4/4/2017    Discharge Date: 4/8/2017    Admission Diagnoses: OSTEOARTHRITIS LEFT KNEE,DIABETIC TYPE II,ELEVATED CHOLESTEROL,REFLUX,HX OF DVT  Osteoarthritis of left knee    Discharge Diagnoses:    Problem List as of 4/6/2017  Date Reviewed: 4/4/2017          Codes Class Noted - Resolved    Anemia due to acute blood loss ICD-10-CM: D62  ICD-9-CM: 285.1  4/5/2017 - Present        Hypomagnesemia ICD-10-CM: E83.42  ICD-9-CM: 275.2  4/5/2017 - Present        CAD (coronary artery disease) ICD-10-CM: I25.10  ICD-9-CM: 414.00  4/4/2017 - Present        MRSA nasal colonization ICD-10-CM: Z22.322  ICD-9-CM: V02.54  3/15/2017 - Present        Dyslipidemia ICD-10-CM: E78.5  ICD-9-CM: 272.4  12/23/2015 - Present        Hypertension ICD-10-CM: I10  ICD-9-CM: 401.9  12/23/2015 - Present        History of deep venous thrombosis ICD-10-CM: Z86.718  ICD-9-CM: V12.51  12/23/2015 - Present        History of pulmonary embolism ICD-10-CM: Z86.711  ICD-9-CM: V12.55  12/23/2015 - Present        Osteoarthritis ICD-10-CM: M19.90  ICD-9-CM: 715.90  12/23/2015 - Present        Pneumonia ICD-10-CM: J18.9  ICD-9-CM: 486  12/23/2015 - Present        Urge incontinence ICD-10-CM: N39.41  ICD-9-CM: 788.31  2/21/2013 - Present    Overview Addendum 1/19/2017  8:59 AM by Judy Cota MD     2/21/2013. .. Was on ditropan 10mg xl , c/o urge incontinence Wears pads H/o microscopic hematuria nge w/u in the past . Urine small blood On coumadin . No h/o cva Had back surgery 2000, 2005 Will f/u cysto , check PVR Consider UD / myrbetriq . Neg h/o breast ca.  Premarin cream For atrophic vaginitis, myrbetriq 25mg qd Has noted improvement when holds back on pink lemonade    2/13 cytology neg   Cytology neg 1/17     CT urogram  1/16/17  There is a single dominant simple fluid density cyst in the left kidney.  The remainder of the tiny cortical hypodensities in both kidneys are too small to characterize.  No obstructing urinary stones or hydronephrosis.  No filling defects in the bladder. Incidental findings including adrenal adenoma and fat-containing umbilical hernia. * (Principal)RESOLVED: Osteoarthritis of left knee ICD-10-CM: M17.12  ICD-9-CM: 715.96  3/22/2017 - 4/5/2017        RESOLVED: Osteoarthritis of right hip (Chronic) ICD-10-CM: M16.11  ICD-9-CM: 715.95  8/4/2013 - 8/6/2013        RESOLVED: Pneumonia due to infectious organism ICD-10-CM: J18.9  ICD-9-CM: 136.9, 484.8  12/21/2009 - 4/4/2017              Discharge Condition: Good    Hospital Course: The patient underwent  Left TKA on 4/4/2017. The patient tolerated the procedure well. Vital signs remained stable and the patient was transferred to  2nd floor surgical unit without complications. The patient remained neurovascularly intact and began getting out of bed with physical therapy on  POD #1. Pain was controlled with femoral nerve catheter an Dilaudid pills for break through pain. MsContin was added due to pain issues on POD #2. The drain and femoral nerve catheter were pulled on POD #2. Due to the slow progress with P.T, the decision for SNF placement has been made and the patient will be transferred on POD #3. Transportation to SNF will be required due to unsteady gait and need of assistance when ambulating. Lovenox and Coumadin were restarted on POD #1 for DVT prophylaxis. The patient will continue with Lovenox until INR is therapeutic between 2-3 . She will remain on her ususal Coumadin . The drain was discontinued on POD#2. She was transferred to telemetry for monitoring due to MEWS score of 4 , elevated temperature and tachycardia. She was placed on Vanco IV a& Azactam, later changed to levaquin  per Hospitalist for aspiration pneumonia.  She received 2 units PRBC for acute blood loss anemia on POD #3. The patient progressed slowly with physical therapy and was ambulating 1 feet on POD #1 . She was transferred to SNF the evening of POD#4. Pt will be evaluated and cleared by Medicine on Saturday for discharge. Consults: Hospitalist     Significant Diagnostic Studies:   Recent Labs      04/06/17   0155  04/05/17   1237  04/05/17   0250   HGB  8.1*  8.9*  8.2*     Recent Labs      04/06/17   0155  04/05/17   1237  04/05/17   0250   HCT  25.5*  27.7*  25.5*       Current Discharge Medication List      START taking these medications    Details   morphine CR (MS CONTIN) 15 mg CR tablet Take 1 Tab by mouth every twelve (12) hours for 15 days. Max Daily Amount: 30 mg.  Qty: 30 Tab, Refills: 0      HYDROmorphone (DILAUDID) 2 mg tablet Take 1-2 Tabs by mouth every four (4) hours as needed. Max Daily Amount: 24 mg. Qty: 90 Tab, Refills: 0         CONTINUE these medications which have CHANGED    Details   enoxaparin (LOVENOX) 30 mg/0.3 mL injection 0.3 mL by SubCUTAneous route every twelve (12) hours every twelve (12) hours. Qty: 14 Syringe, Refills: 0         CONTINUE these medications which have NOT CHANGED    Details   mupirocin calcium (BACTROBAN NASAL) 2 % nasal ointment by Both Nostrils route two (2) times a day. cycloSPORINE (RESTASIS) 0.05 % ophthalmic emulsion Administer 1 Drop to both eyes two (2) times a day. Indications: DRY EYE      sucralfate (CARAFATE) 100 mg/mL suspension Take 2 tsp by mouth two (2) times a day. Indications: Reflux, ulcer      magnesium oxide (MAG-OX) 400 mg tablet Take 400 mg by mouth two (2) times a day. potassium chloride (K-DUR, KLOR-CON) 20 mEq tablet Take  by mouth two (2) times a day. esomeprazole (NEXIUM) 40 mg capsule Take 40 mg by mouth daily. Hydrochlorothiazide 12.5 mg tablet Take 12.5 mg by mouth daily. Indications: Edema      calcium citrate-vitamin D3 (CITRACAL + D) tablet Take 1 Tab by mouth daily.       !! warfarin (COUMADIN) 2.5 mg tablet Take 2.5 mg by mouth every Tuesday. Indications: DEEP VENOUS THROMBOSIS      oxymetazoline (THEOPHYLLINE) 0.05 % nasal spray 2 Sprays two (2) times a day. Indications: Nasal Congestion      mirabegron ER (MYRBETRIQ) 25 mg ER tablet Take 1 Tab by mouth daily. Indications: URINARY URGE INCONTINENCE  Qty: 90 Tab, Refills: 3      !! warfarin (COUMADIN) 5 mg tablet Take 5 mg by mouth six (6) days a week. Indications: DEEP VENOUS THROMBOSIS      alendronate (FOSAMAX) 70 mg tablet Take 70 mg by mouth every Tuesday. Plus Vit D  Indications: POST-MENOPAUSAL OSTEOPOROSIS       !! - Potential duplicate medications found. Please discuss with provider. STOP taking these medications       acetaminophen (ACETAMINOPHEN EXTRA STRENGTH) 500 mg tablet Comments:   Reason for Stopping:         traMADol (ULTRAM) 50 mg tablet Comments:   Reason for Stopping:               Activity: activity as tolerated, weight bearing as tolerated. No anti- inflammatory medications. Use stool softeners at home while taking pain medications since  they are constipating. Diet: Regular Diet    Wound Care: Keep wound clean and dry, Reinforce dressing PRN and ice to area for comfort. Do not get wound wet for 5 days.     Follow-up: 10 days with Dr Jaky Pereira

## 2017-04-06 NOTE — PROGRESS NOTES
Problem: Self Care Deficits Care Plan (Adult)  Goal: *Acute Goals and Plan of Care (Insert Text)  Initial OT STGs (4/5/2017-4/11/2017). Within 7 days:    1. Patient will perform toilet transfer with supervision in preparation for bowel and bladder management. 2. Patient will perform bowel and bladder management with supervision for increased independence with ADLs. 3. Patient will perform UB dressing with supervision for increased independence with ADLs. 4. Patient will perform LB dressing with supervision for increased independence with ADLs. 5. Patient will tolerate standing for 8 minutes with supervision in preparation for standing ADLs. 6. Patient will utilize energy conservation techniques with no verbal cues for increased independence with ADLs. OT treatment session attempted at this time. Patient very drowsy at this time and fallowing asleep while talking with therapists. Will follow up as patient's schedule allows.     ---Nathaniel Prescott, OTR/L

## 2017-04-06 NOTE — ROUTINE PROCESS
Bedside shift change report given to Adam (oncoming nurse) by Christine Lazaro RN (offgoing nurse). Report included the following information SBAR, Kardex and MAR.

## 2017-04-06 NOTE — PROGRESS NOTES
8402 - Bedside report received from Rigoberto Christopher RN. Patient in bed at this time. Pain 8/10, ice in place. Plan of care for the day addressed with the patient.

## 2017-04-06 NOTE — PROGRESS NOTES
Problem: Mobility Impaired (Adult and Pediatric)  Goal: *Acute Goals and Plan of Care (Insert Text)  In 1-7 days pt will be able to perform:  ST. Bed mobility: Rolling L to R to L modified independent for positioning. 2. Supine to sit to supine S with HR for meals. 3. Sit to stand to sit S with RW in prep for ambulation. LT. Gait: Ambulate >150ft S with RW, WBAT, for home/community mobility. 2. Stair Negotiation: Ascend/descend >2 steps CGA with HR for home entry. 3. Activity tolerance: Tolerate up in chair 1-2 hours for ADLs. 4. Patient/Family Education: Patient/family to be independent with HEP for follow-up care and safe discharge. Outcome: Progressing Towards Goal  PHYSICAL THERAPY TREATMENT     Patient: Michaelle Hill (34 y.o. female)  Date: 2017  Diagnosis: OSTEOARTHRITIS LEFT KNEE,DIABETIC TYPE II,ELEVATED CHOLESTEROL,REFLUX,HX OF DVT  Osteoarthritis of left knee Osteoarthritis of left knee  Procedure(s) (LRB):  LEFT TOTAL KNEE ARTHROPLASTY - SPEC POP, **CONTACT ISOLATION** (Left) 2 Days Post-Op  Precautions: Fall, WBAT   Chart, physical therapy assessment, plan of care and goals were reviewed. ASSESSMENT:  Patient is improving slowly but continues to do very poorly with all mobility. Patient was assisted to seated at edge of bed with maximal assistance, tolerating seated position well. Patient then stood up to a rolling walker with maximal assistance. Patient was unable to take steps and appears to currently lack the coordination. Patient was seated back in bed. Patient then vomited. Returned the patient back to supine in bed with maximal assistance. Nursing informed. Will continue PT and progress towards gait training. Recommend rehab at discharge.   Progression toward goals:  [ ]      Improving appropriately and progressing toward goals  [X]      Improving slowly and progressing toward goals  [ ]      Not making progress toward goals and plan of care will be adjusted PLAN:  Patient continues to benefit from skilled intervention to address the above impairments. Continue treatment per established plan of care. Discharge Recommendations:  Rehab  Further Equipment Recommendations for Discharge:  N/A       SUBJECTIVE:   Patient stated I can't do it.       OBJECTIVE DATA SUMMARY:   Critical Behavior:  Neurologic State: Alert  Orientation Level: Oriented X4  Cognition: Follows commands  Safety/Judgement: Awareness of environment  Functional Mobility Training:  Bed Mobility:  Rolling: Maximum assistance  Supine to Sit: Maximum assistance;Assist x2  Sit to Supine: Maximum assistance;Assist x2  Scooting: Maximum assistance  Transfers:  Sit to Stand: Maximum assistance  Stand to Sit: Maximum assistance  Balance:  Sitting: Impaired; With support  Sitting - Static: Fair (occasional)  Sitting - Dynamic: Fair (occasional)  Standing: Impaired; With support  Standing - Static: Poor     Pain:  Pain Scale 1: Numeric (0 - 10)  Pain Intensity 1: 6  Pain Location 1: Knee  Pain Orientation 1: Left  Pain Description 1: Aching  Pain Intervention(s) 1: Ice  Activity Tolerance:   Poor  Please refer to the flowsheet for vital signs taken during this treatment.   After treatment:   [ ] Patient left in no apparent distress sitting up in chair  [X] Patient left in no apparent distress in bed  [X] Call bell left within reach  [X] Nursing notified  [ ] Caregiver present  [ ] Bed alarm activated      Erika Purcell   Time Calculation: 26 mins

## 2017-04-06 NOTE — ROUTINE PROCESS
Bedside and Verbal shift change report given to THIAGO Blanco (oncoming nurse) by Fernando Fajardo RN (offgoing nurse). Report included the following information SBAR, Kardex and MAR.

## 2017-04-06 NOTE — PROGRESS NOTES
Progress Note POD #2      Patient: Donta Trent               Sex: female          DOA: 4/4/2017         YOB: 1940      Surgery: Procedure(s):  LEFT TOTAL KNEE ARTHROPLASTY - SPEC POP, **CONTACT ISOLATION**           LOS: 2 days               Subjective:     C/O knee pain, Uncomfortable now. Interested in [address] now. Objective:      Visit Vitals    /60 (BP 1 Location: Left arm, BP Patient Position: At rest)    Pulse (!) 101    Temp 98.9 °F (37.2 °C)    Resp 16    Ht 5' 3\" (1.6 m)    Wt 87.1 kg (192 lb 1 oz)    SpO2 95%    BMI 34.02 kg/m2       Physical Exam:  Neurological: Dressing C/D/I.                            sensation: intact to light touch. No calf pain to palpation. Patient mobility  Bed Mobility Training  Supine to Sit: Moderate assistance, Maximum assistance  Sit to Supine: Moderate assistance, Maximum assistance  Scooting: Maximum assistance  Transfer Training  Sit to Stand:  (N/A)  Stand to Sit:  (N/A)      Gait Training  Assistive Device: Gait belt, Walker, rolling  Ambulation - Level of Assistance:  Moderate assistance, Assist x2  Distance (ft): 1 Feet (ft)  Interventions: Safety awareness training, Verbal cues   Weight Bearing Status  Left Side Weight Bearing: As tolerated   Patient Response  Patient Response: Poor    Intake and Output:  Current Shift:  04/06 0701 - 04/06 1900  In: -   Out: 100 [Urine:100]  Last three shifts:  04/04 1901 - 04/06 0700  In: 3702.2 [P.O.:1580; I.V.:2122.2]  Out: 0252 [Urine:2725; Drains:110]    Lab/Data Reviewed:  Lab Results   Component Value Date/Time    WBC 9.7 04/06/2017 01:55 AM    HGB 8.1 04/06/2017 01:55 AM    HCT 25.5 04/06/2017 01:55 AM    PLATELET 761 01/64/2278 01:55 AM    MCV 89.5 04/06/2017 01:55 AM     Lab Results   Component Value Date/Time    aPTT 29.2 08/05/2013 06:00 AM     Lab Results   Component Value Date/Time    INR 1.3 04/06/2017 01:55 AM    INR 1.1 04/05/2017 02:50 AM    INR 1.0 04/04/2017 10:30 AM Prothrombin time 15.4 04/06/2017 01:55 AM    Prothrombin time 14.0 04/05/2017 02:50 AM    Prothrombin time 13.1 04/04/2017 10:30 AM          Assessment/Plan     Active Problems:    MRSA nasal colonization (3/15/2017)      CAD (coronary artery disease) (4/4/2017)      Anemia due to acute blood loss (4/5/2017)      Hypomagnesemia (4/5/2017)        1. Stable, Afebrile now. 2. OOB with PT  3. D/C Planning- SNF placement. 4. IV dilaudid dose now. 5. Add MS Contin for long term coverage. 6. Acute blood loss anemia, will continue to monitor. May transfuse if HGB below 8.0.

## 2017-04-07 LAB
ANION GAP BLD CALC-SCNC: 9 MMOL/L (ref 3–18)
BASOPHILS # BLD AUTO: 0 K/UL (ref 0–0.06)
BASOPHILS # BLD: 0 % (ref 0–2)
BUN SERPL-MCNC: 7 MG/DL (ref 7–18)
BUN/CREAT SERPL: 10 (ref 12–20)
CALCIUM SERPL-MCNC: 8.3 MG/DL (ref 8.5–10.1)
CHLORIDE SERPL-SCNC: 103 MMOL/L (ref 100–108)
CO2 SERPL-SCNC: 26 MMOL/L (ref 21–32)
CREAT SERPL-MCNC: 0.69 MG/DL (ref 0.6–1.3)
DIFFERENTIAL METHOD BLD: ABNORMAL
EOSINOPHIL # BLD: 0 K/UL (ref 0–0.4)
EOSINOPHIL NFR BLD: 0 % (ref 0–5)
ERYTHROCYTE [DISTWIDTH] IN BLOOD BY AUTOMATED COUNT: 16.5 % (ref 11.6–14.5)
GLUCOSE SERPL-MCNC: 128 MG/DL (ref 74–99)
HCT VFR BLD AUTO: 22.8 % (ref 35–45)
HGB BLD-MCNC: 7.3 G/DL (ref 12–16)
INR PPP: 1.3 (ref 0.8–1.2)
LYMPHOCYTES # BLD AUTO: 14 % (ref 21–52)
LYMPHOCYTES # BLD: 1.6 K/UL (ref 0.9–3.6)
MAGNESIUM SERPL-MCNC: 2.1 MG/DL (ref 1.6–2.6)
MCH RBC QN AUTO: 28.9 PG (ref 24–34)
MCHC RBC AUTO-ENTMCNC: 32 G/DL (ref 31–37)
MCV RBC AUTO: 90.1 FL (ref 74–97)
MONOCYTES # BLD: 1.5 K/UL (ref 0.05–1.2)
MONOCYTES NFR BLD AUTO: 14 % (ref 3–10)
NEUTS SEG # BLD: 7.9 K/UL (ref 1.8–8)
NEUTS SEG NFR BLD AUTO: 72 % (ref 40–73)
PLATELET # BLD AUTO: 232 K/UL (ref 135–420)
PMV BLD AUTO: 9.3 FL (ref 9.2–11.8)
POTASSIUM SERPL-SCNC: 4.8 MMOL/L (ref 3.5–5.5)
PROTHROMBIN TIME: 16 SEC (ref 11.5–15.2)
RBC # BLD AUTO: 2.53 M/UL (ref 4.2–5.3)
SODIUM SERPL-SCNC: 138 MMOL/L (ref 136–145)
WBC # BLD AUTO: 11.1 K/UL (ref 4.6–13.2)

## 2017-04-07 PROCEDURE — 36430 TRANSFUSION BLD/BLD COMPNT: CPT

## 2017-04-07 PROCEDURE — 74011250637 HC RX REV CODE- 250/637: Performed by: HOSPITALIST

## 2017-04-07 PROCEDURE — 74011000250 HC RX REV CODE- 250: Performed by: PHYSICIAN ASSISTANT

## 2017-04-07 PROCEDURE — 85610 PROTHROMBIN TIME: CPT | Performed by: PHYSICIAN ASSISTANT

## 2017-04-07 PROCEDURE — 74011250636 HC RX REV CODE- 250/636: Performed by: HOSPITALIST

## 2017-04-07 PROCEDURE — 80048 BASIC METABOLIC PNL TOTAL CA: CPT | Performed by: PHYSICIAN ASSISTANT

## 2017-04-07 PROCEDURE — 74011250637 HC RX REV CODE- 250/637: Performed by: INTERNAL MEDICINE

## 2017-04-07 PROCEDURE — 85025 COMPLETE CBC W/AUTO DIFF WBC: CPT | Performed by: PHYSICIAN ASSISTANT

## 2017-04-07 PROCEDURE — 74011000250 HC RX REV CODE- 250: Performed by: HOSPITALIST

## 2017-04-07 PROCEDURE — 36415 COLL VENOUS BLD VENIPUNCTURE: CPT | Performed by: PHYSICIAN ASSISTANT

## 2017-04-07 PROCEDURE — 65660000000 HC RM CCU STEPDOWN

## 2017-04-07 PROCEDURE — 74011250637 HC RX REV CODE- 250/637: Performed by: PHYSICIAN ASSISTANT

## 2017-04-07 PROCEDURE — 97110 THERAPEUTIC EXERCISES: CPT

## 2017-04-07 PROCEDURE — 83735 ASSAY OF MAGNESIUM: CPT | Performed by: PHYSICIAN ASSISTANT

## 2017-04-07 PROCEDURE — 97530 THERAPEUTIC ACTIVITIES: CPT

## 2017-04-07 PROCEDURE — 74011000258 HC RX REV CODE- 258: Performed by: HOSPITALIST

## 2017-04-07 PROCEDURE — 30233N1 TRANSFUSION OF NONAUTOLOGOUS RED BLOOD CELLS INTO PERIPHERAL VEIN, PERCUTANEOUS APPROACH: ICD-10-PCS | Performed by: ORTHOPAEDIC SURGERY

## 2017-04-07 PROCEDURE — P9016 RBC LEUKOCYTES REDUCED: HCPCS | Performed by: ORTHOPAEDIC SURGERY

## 2017-04-07 RX ORDER — FUROSEMIDE 10 MG/ML
20 INJECTION INTRAMUSCULAR; INTRAVENOUS ONCE
Status: COMPLETED | OUTPATIENT
Start: 2017-04-07 | End: 2017-04-07

## 2017-04-07 RX ORDER — TRAMADOL HYDROCHLORIDE 50 MG/1
25 TABLET ORAL
Status: COMPLETED | OUTPATIENT
Start: 2017-04-07 | End: 2017-04-07

## 2017-04-07 RX ORDER — WARFARIN SODIUM 5 MG/1
5 TABLET ORAL ONCE
Status: COMPLETED | OUTPATIENT
Start: 2017-04-07 | End: 2017-04-07

## 2017-04-07 RX ORDER — SODIUM CHLORIDE 9 MG/ML
250 INJECTION, SOLUTION INTRAVENOUS AS NEEDED
Status: DISCONTINUED | OUTPATIENT
Start: 2017-04-07 | End: 2017-04-08 | Stop reason: HOSPADM

## 2017-04-07 RX ADMIN — DOCUSATE SODIUM 100 MG: 100 CAPSULE, LIQUID FILLED ORAL at 09:47

## 2017-04-07 RX ADMIN — ENOXAPARIN SODIUM 90 MG: 100 INJECTION SUBCUTANEOUS at 19:59

## 2017-04-07 RX ADMIN — SODIUM CHLORIDE 1000 MG: 900 INJECTION, SOLUTION INTRAVENOUS at 06:50

## 2017-04-07 RX ADMIN — AZTREONAM 1 G: 1 INJECTION, POWDER, LYOPHILIZED, FOR SOLUTION INTRAMUSCULAR; INTRAVENOUS at 09:46

## 2017-04-07 RX ADMIN — SUCRALFATE 1 G: 1 SUSPENSION ORAL at 09:00

## 2017-04-07 RX ADMIN — ENOXAPARIN SODIUM 90 MG: 100 INJECTION SUBCUTANEOUS at 09:46

## 2017-04-07 RX ADMIN — FUROSEMIDE 20 MG: 10 INJECTION, SOLUTION INTRAMUSCULAR; INTRAVENOUS at 16:40

## 2017-04-07 RX ADMIN — HYDROMORPHONE HYDROCHLORIDE 4 MG: 4 TABLET ORAL at 14:04

## 2017-04-07 RX ADMIN — HYDROMORPHONE HYDROCHLORIDE 4 MG: 4 TABLET ORAL at 00:09

## 2017-04-07 RX ADMIN — FERROUS SULFATE TAB 325 MG (65 MG ELEMENTAL FE) 325 MG: 325 (65 FE) TAB at 18:24

## 2017-04-07 RX ADMIN — Medication 400 MG: at 22:26

## 2017-04-07 RX ADMIN — Medication 10 ML: at 22:26

## 2017-04-07 RX ADMIN — MORPHINE SULFATE 15 MG: 15 TABLET, EXTENDED RELEASE ORAL at 22:25

## 2017-04-07 RX ADMIN — Medication 400 MG: at 09:48

## 2017-04-07 RX ADMIN — POTASSIUM CHLORIDE 20 MEQ: 20 TABLET, EXTENDED RELEASE ORAL at 22:26

## 2017-04-07 RX ADMIN — SUCRALFATE 1 G: 1 SUSPENSION ORAL at 22:25

## 2017-04-07 RX ADMIN — MORPHINE SULFATE 15 MG: 15 TABLET, EXTENDED RELEASE ORAL at 13:24

## 2017-04-07 RX ADMIN — ACETAMINOPHEN 650 MG: 325 TABLET, FILM COATED ORAL at 14:04

## 2017-04-07 RX ADMIN — SODIUM CHLORIDE 1000 MG: 900 INJECTION, SOLUTION INTRAVENOUS at 19:54

## 2017-04-07 RX ADMIN — FERROUS SULFATE TAB 325 MG (65 MG ELEMENTAL FE) 325 MG: 325 (65 FE) TAB at 09:48

## 2017-04-07 RX ADMIN — HYDROMORPHONE HYDROCHLORIDE 4 MG: 4 TABLET ORAL at 08:11

## 2017-04-07 RX ADMIN — HYDROMORPHONE HYDROCHLORIDE 2 MG: 2 TABLET ORAL at 04:11

## 2017-04-07 RX ADMIN — CYCLOSPORINE 1 DROP: 0.5 EMULSION OPHTHALMIC at 22:25

## 2017-04-07 RX ADMIN — TRAMADOL HYDROCHLORIDE 25 MG: 50 TABLET, FILM COATED ORAL at 17:14

## 2017-04-07 RX ADMIN — POTASSIUM CHLORIDE 20 MEQ: 20 TABLET, EXTENDED RELEASE ORAL at 09:48

## 2017-04-07 RX ADMIN — PANTOPRAZOLE SODIUM 40 MG: 40 TABLET, DELAYED RELEASE ORAL at 09:48

## 2017-04-07 RX ADMIN — Medication 10 ML: at 18:27

## 2017-04-07 RX ADMIN — AZTREONAM 1 G: 1 INJECTION, POWDER, LYOPHILIZED, FOR SOLUTION INTRAMUSCULAR; INTRAVENOUS at 16:40

## 2017-04-07 RX ADMIN — DOCUSATE SODIUM 100 MG: 100 CAPSULE, LIQUID FILLED ORAL at 22:25

## 2017-04-07 RX ADMIN — HYDROCHLOROTHIAZIDE 12.5 MG: 25 TABLET ORAL at 09:48

## 2017-04-07 RX ADMIN — WARFARIN SODIUM 5 MG: 5 TABLET ORAL at 17:14

## 2017-04-07 RX ADMIN — AZTREONAM 1 G: 1 INJECTION, POWDER, LYOPHILIZED, FOR SOLUTION INTRAMUSCULAR; INTRAVENOUS at 00:01

## 2017-04-07 NOTE — PROGRESS NOTES
0002-Resting in bed, eyes closed, arouses easily. Stable. Mepilex, left knee, intact, old drainage. Positive dorsalis pedis pulse, warm, sensation, movement, dorsi flex, plantar flex, and no complaint of calf pain to left lower extremity. Left heel elevated on pillow. Incentive spirometer in use. Tele-box 5 in place. Call light and personal items within reach. Assessment complete. 0408-No change from initial assessment. Stable. 0630-Stable. Resting in bed. Assisted with repositioning left heel for comfort. Shift Summary:  Reluctant to ambulate. Uneventful shift. Rested quietly.

## 2017-04-07 NOTE — PROGRESS NOTES
2200: Pt stated she lost her dentures and cell phone while transferring. Called 2B to see if her belongings where there. Waiting for call back. Will monitor.

## 2017-04-07 NOTE — PROGRESS NOTES
Pharmacy Dosing Services: Warfarin    Consult for Warfarin Dosing by Pharmacy by Dr. Meri Martínez provided for this 78 y.o.  female , for indication of History of Pulmonary Embolism. Dose to achieve an INR goal of 2-3    Order entered for Warfarin 5 mg ordered to be given today at 18:00. Significant drug interactions: n/a    LABS    PT/INR Lab Results   Component Value Date/Time    INR 1.3 04/07/2017 02:45 AM      Platelets Lab Results   Component Value Date/Time    PLATELET 128 24/36/7306 02:45 AM      H/H     Lab Results   Component Value Date/Time    HGB 7.3 04/07/2017 02:45 AM        Warfarin Administrations (last 168 hours)       Date/Time Action Medication Dose      04/06/17 1848 Given    warfarin (COUMADIN) tablet 5 mg 5 mg    04/05/17 1820 Given    warfarin (COUMADIN) tablet 5 mg 5 mg    04/04/17 1939 Given    warfarin (COUMADIN) tablet 5 mg 5 mg              Pharmacy to follow daily and will provide subsequent Warfarin dosing based on clinical status.   SAUL Browning)  Contact information [phone]

## 2017-04-07 NOTE — PROGRESS NOTES
Problem: Mobility Impaired (Adult and Pediatric)  Goal: *Acute Goals and Plan of Care (Insert Text)  In 1-7 days pt will be able to perform:  ST. Bed mobility: Rolling L to R to L modified independent for positioning. 2. Supine to sit to supine S with HR for meals. 3. Sit to stand to sit S with RW in prep for ambulation. LT. Gait: Ambulate >150ft S with RW, WBAT, for home/community mobility. 2. Stair Negotiation: Ascend/descend >2 steps CGA with HR for home entry. 3. Activity tolerance: Tolerate up in chair 1-2 hours for ADLs. 4. Patient/Family Education: Patient/family to be independent with HEP for follow-up care and safe discharge. Patient is currently receiving blood. Will follow up later for PT treatment as patient's schedule permits.      Aditya Granda PT

## 2017-04-07 NOTE — ROUTINE PROCESS
Bedside and Verbal shift change report given to Dheeraj Drummond RN (oncoming nurse) by Aliza Hawkins RN (offgoing nurse). Report included the following information SBAR and Kardex.

## 2017-04-07 NOTE — PROGRESS NOTES
Chart reviewed, noted planned d/c to SNF has been put on hold. T/C [facility] [place] Admissions / Brandon Ordaz ([phone]) and advised her of no discharge today. Brandon Ordaz stated that [facility] could accept pt on Saturday or Sunday if pt were medically stable. If not stable over the weekend, CMgr will f/u with [facility] on Monday. Met with pt and advised her of above. Plan: if stable Saturday or Sunday, d/c to SNF at [facility] and [address] [place] [address] Jaden Ruano [address], Breanne Jacobson, [address] [place] [address] via stretcher ambulance . Pt would need miroslava there befor 5:00 pm.  Pt will need her Discharge Summary, Med Rec, and scripts for controlled meds to accompany her. [facility] [phone].

## 2017-04-07 NOTE — PROGRESS NOTES
Bedside and Verbal shift change report given to Marco Silva (oncoming nurse) by Al Salinas RN   (offgoing nurse). Report included the following information SBAR, Kardex and MAR.

## 2017-04-07 NOTE — PROGRESS NOTES
Problem: Self Care Deficits Care Plan (Adult)  Goal: *Acute Goals and Plan of Care (Insert Text)  Initial OT STGs (4/5/2017-4/11/2017). Within 7 days:    1. Patient will perform toilet transfer with supervision in preparation for bowel and bladder management. 2. Patient will perform bowel and bladder management with supervision for increased independence with ADLs. 3. Patient will perform UB dressing with supervision for increased independence with ADLs. 4. Patient will perform LB dressing with supervision for increased independence with ADLs. 5. Patient will tolerate standing for 8 minutes with supervision in preparation for standing ADLs. 6. Patient will utilize energy conservation techniques with no verbal cues for increased independence with ADLs. Outcome: Not Progressing Towards Goal  Attempted OT treatment session, however pt receiving 1 of 2 units of PRBCs and very lethargic. OT to follow up with pt later today.      Yuridia Leo MS OTR/L

## 2017-04-07 NOTE — PROGRESS NOTES
Progress Note POD #      Patient: Cony Bush               Sex: female          DOA: 4/4/2017         YOB: 1940      Surgery: Procedure(s):  LEFT TOTAL KNEE ARTHROPLASTY - SPEC POP, **CONTACT ISOLATION**           LOS: 3 days               Subjective:     No new complaints    Objective:      Visit Vitals    /60    Pulse (!) 113    Temp 99.8 °F (37.7 °C)    Resp 18    Ht 5' 3\" (1.6 m)    Wt 86.1 kg (189 lb 13.1 oz)    SpO2 96%    BMI 33.62 kg/m2       Physical Exam:  Neurological: motor strength: 5/5 in lower extremities bilaterally                          sensation: intact to light touch  Patient mobility  Bed Mobility Training  Rolling: Maximum assistance  Supine to Sit: Maximum assistance, Assist x2  Sit to Supine: Maximum assistance, Assist x2  Scooting: Maximum assistance  Transfer Training  Sit to Stand: Maximum assistance  Stand to Sit: Maximum assistance      Gait Training  Assistive Device: Gait belt, Walker, rolling  Ambulation - Level of Assistance: Moderate assistance, Assist x2  Distance (ft): 1 Feet (ft)  Interventions: Safety awareness training, Verbal cues   Weight Bearing Status  Left Side Weight Bearing: As tolerated   Patient Response  Patient Response: Poor    Intake and Output:  Current Shift:  04/07 0701 - 04/07 1900  In: -   Out: 250 [Urine:250]  Last three shifts:  04/05 1901 - 04/07 0700  In: 2120 [P.O.:1620;  I.V.:500]  Out: 4225 [Urine:4225]    Lab/Data Reviewed:    Lab/Data Reviewed:  Lab Results   Component Value Date/Time    WBC 11.1 04/07/2017 02:45 AM    HGB 7.3 04/07/2017 02:45 AM    HCT 22.8 04/07/2017 02:45 AM    PLATELET 897 50/68/9062 02:45 AM    MCV 90.1 04/07/2017 02:45 AM     Lab Results   Component Value Date/Time    aPTT 29.2 08/05/2013 06:00 AM     Lab Results   Component Value Date/Time    INR 1.3 04/07/2017 02:45 AM    INR 1.3 04/06/2017 01:55 AM    INR 1.1 04/05/2017 02:50 AM    Prothrombin time 16.0 04/07/2017 02:45 AM    Prothrombin time 15.4 04/06/2017 01:55 AM    Prothrombin time 14.0 04/05/2017 02:50 AM            Assessment/Plan     Active Problems:    MRSA nasal colonization (3/15/2017)      CAD (coronary artery disease) (4/4/2017)      Anemia due to acute blood loss (4/5/2017)      Hypomagnesemia (4/5/2017)      Aspiration pneumonia ([facility] (4/6/2017)      Sepsis ([facility] (4/6/2017)        1. Stable  2. OOB with PT  3. D/C Planning to rehab  4.  Low Hct with increase in pulse--transfuse 1 unit PRBC prior to discharge

## 2017-04-07 NOTE — ROUTINE PROCESS
Bedside and Verbal shift change report given to Belle Redman RN (oncoming nurse) by Bethany Nageotte, RN (offgoing nurse). Report included the following information SBAR and Kardex.

## 2017-04-07 NOTE — PROGRESS NOTES
4899 - Contacted dietary. Informed that I has called yesterday concerning the patient's cell phone possibly being left on the meal tray. Staff to verify with other dietary staff and call back. 8849 - Received return call from dietary staff who states that they have not found a cell phone today or yesterday.

## 2017-04-07 NOTE — PROGRESS NOTES
Assumed care of patient at this time. Pt alert and oriented times 4. Patient resting in bed, no complaints of chest pain, SOB, numbness or tingling at this time. Pt given instructions on incentive spirometer and demonstrated use. 18G Left Hand IVF infusing as ordered. Mepilex dressing in place bread through drainage. and ice pack applied. TEDs and Plexis in place bilaterally as ordered. Pt heels remain elevated on pillow due to pt stated her heels hurt. Pt remained on 2LTNC due to decrease O2 Sat. Call bell, telephone, and personal belongings within reach.     1125 First dose of blood transfusion started, pt received instruction on potential side effect and adverse effect, pt acknowledge. VS stable remain with pt first half an hour, no complaints of pain, SOB or chest pain. Pt also denies any side effect or adverse effects. 1300 Pt transfer to bedside commode to void, 2 person assist needed. Pt transfer to chair at bedside. Pt request to return to bed, stating she was in a lot of pain. Pt transferred to bed with 2 person assist.     1404 PAIN medication given Dilaudid 4 mg PO. Potential medication side effects explained to patient, patient verbalizes understanding, opportunities for questions provided. Patient stable, no apparent distress at this time, bed in locked position, call bell within reach. 1500 Transfusion Stopped, IV fluids continued. 1645 Pt complaint of pain, request additional medication. Verbal order for Ultram 25 mg by mouth NOW, one dose. Telephone order read back will continue to monitor. 1702 Before second transfusion pt received Lasix 20 mg IV. Second dose of blood transfusion started, pt received instruction on potential side effect and adverse effect, pt acknowledge. VS stable remain with pt first half an hour, no complaints of pain, SOB or chest pain. Pt also denies any side effect or adverse effects. Shift summary: Patient had uneventful shift.  Patient ambulated with assistance using walker. Pain remained controlled with medication. Pt remain tachycardia throughout shift.  No issues/concerns at this time

## 2017-04-07 NOTE — PROGRESS NOTES
Problem: Self Care Deficits Care Plan (Adult)  Goal: *Acute Goals and Plan of Care (Insert Text)  Initial OT STGs (4/5/2017-4/11/2017). Within 7 days:    1. Patient will perform toilet transfer with supervision in preparation for bowel and bladder management. 2. Patient will perform bowel and bladder management with supervision for increased independence with ADLs. 3. Patient will perform UB dressing with supervision for increased independence with ADLs. 4. Patient will perform LB dressing with supervision for increased independence with ADLs. 5. Patient will tolerate standing for 8 minutes with supervision in preparation for standing ADLs. 6. Patient will utilize energy conservation techniques with no verbal cues for increased independence with ADLs. Outcome: Not Progressing Towards Goal  Second attempt made to see pt. Pt refused at this time stating she just returned to bed after getting out of bed to a chair with nursing. Per nursing, pt just returned to bed ~10 minutes ago. OT to follow up with pt as schedule allows.      Chichi Watson MS OTR/L

## 2017-04-07 NOTE — PROGRESS NOTES
Hospitalist Progress Note-critical care note     Patient: Remy Palacios MRN: [id number]  CSN: [id number]    YOB: 1940  Age: 78 y.o. Sex: female    DOA: 4/4/2017 LOS:  LOS: 3 days            Chief complaint: hx of dvt and PE. Anemia , htn , aspiration pna     Assessment/Plan         Patient Active Problem List   Diagnosis Code    Urge incontinence N39.41    Dyslipidemia E78.5    Hypertension I10    History of deep venous thrombosis Z86.718    History of pulmonary embolism Z86.711    Osteoarthritis M19.90    Pneumonia J18.9    MRSA nasal colonization Z22.200    CAD (coronary artery disease) I25.10    Anemia due to acute blood loss D62    Hypomagnesemia E83.42    Aspiration pneumonia (HCC) J69.0    Sepsis ([address] A41.9     1. OA sp TKA   continue pain control per primary team and Pt/ ot    2. 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 CAD with out angina  Stable , will switch to cardiac diet   4. HTN  Well controlled, continue current meds   5. GERD  ppi   6. Osteoporosis   7 anemia due to acute blood loss   On iron ,will transfuse 2 units prbc,     8 hypomagnesemia  Resolved       9 aspiration pna and still fever , sepsis     Continue vanc and azactam, bcx,     PA/LA  X-ray , worsening opacity     Cxr: Left basilar density may represent atelectasis or infiltrate. Right lower  midlung field density likely represents atelectasis or scarring. Widening of the mediastinum may be artifactual given the portable technique. Standard PA and lateral views are suggested to exclude a mediastinal mass. Subjective: feel better   Nurse: pain controlled alex         Review of systems:    General: No fevers or chills. Cardiovascular: No chest pain or pressure. No palpitations. Pulmonary: No shortness of breath. Gastrointestinal: No nausea, vomiting.      Vital signs/Intake and Output:  Visit Vitals    /71 (BP 1 Location: Right arm, BP Patient Position: At rest)    Pulse (!) 107    Temp 100.2 °F (37.9 °C)    Resp 18    Ht 5' 3\" (1.6 m)    Wt 86.1 kg (189 lb 13.1 oz)    SpO2 99%    BMI 33.62 kg/m2     Current Shift:  04/07 0701 - 04/07 1900  In: 320 [P.O.:320]  Out: 550 [Urine:550]  Last three shifts:  04/05 1901 - 04/07 0700  In: 2120 [P.O.:1620; I.V.:500]  Out: 4090 [Urine:4225]    Physical Exam:  General: WD, WN. Alert, cooperative, no acute distress    HEENT: NC, Atraumatic. PERRLA, anicteric sclerae. Lungs: CTA Bilaterally. No Wheezing/Rhonchi/Rales. Heart:  Tachy ,  No murmur, No Rubs, No Gallops  Abdomen: Soft, Non distended, Non tender.  +Bowel sounds,   Extremities: No c/c. Left knee covered with dressing , ice pack noted   Psych:   Not anxious or agitated. Neurologic:  No acute neurological deficit. Labs: Results:       Chemistry Recent Labs      04/07/17 0245 04/06/17   1312  04/05/17   0250   GLU  128*   --   137*   NA  138   --   143   K  4.8  4.1  3.8   CL  103   --   108   CO2  26   --   27   BUN  7   --   10   CREA  0.69   --   0.74   CA  8.3*   --   8.3*   AGAP  9   --   8   BUCR  10*   --   14      CBC w/Diff Recent Labs      04/07/17 0245 04/06/17   0155  04/05/17   1237   WBC  11.1  9.7   --    RBC  2.53*  2.85*   --    HGB  7.3*  8.1*  8.9*   HCT  22.8*  25.5*  27.7*   PLT  232  221   --    GRANS  72  60   --    LYMPH  14*  25   --    EOS  0  1   --       Cardiac Enzymes No results for input(s): CPK, CKND1, BRADLEY in the last 72 hours. No lab exists for component: CKRMB, TROIP   Coagulation Recent Labs      04/07/17 0245 04/06/17   0155   PTP  16.0*  15.4*   INR  1.3*  1.3*       Lipid Panel No results found for: CHOL, CHOLPOCT, CHOLX, CHLST, CHOLV, [id number], HDL, LDL, NLDLCT, DLDL, LDLC, DLDLP, 977723, VLDLC, VLDL, TGL, TGLX, TRIGL, [id number], TRIGP, TGLPOCT, [id number], CHHD, CHHDX   BNP No results for input(s): BNPP in the last 72 hours.    Liver Enzymes No results for input(s): TP, ALB, TBIL, AP, SGOT, GPT in the last 72 hours.     No lab exists for component: DBIL   Thyroid Studies No results found for: T4, T3U, TSH, TSHEXT, TSHEXT     Procedures/imaging: see electronic medical records for all procedures/Xrays and details which were not copied into this note but were reviewed prior to creation of Aracely Edmonds MD

## 2017-04-07 NOTE — PROGRESS NOTES
Problem: Mobility Impaired (Adult and Pediatric)  Goal: *Acute Goals and Plan of Care (Insert Text)  In 1-7 days pt will be able to perform:  ST. Bed mobility: Rolling L to R to L modified independent for positioning. 2. Supine to sit to supine S with HR for meals. 3. Sit to stand to sit S with RW in prep for ambulation. LT. Gait: Ambulate >150ft S with RW, WBAT, for home/community mobility. 2. Stair Negotiation: Ascend/descend >2 steps CGA with HR for home entry. 3. Activity tolerance: Tolerate up in chair 1-2 hours for ADLs. 4. Patient/Family Education: Patient/family to be independent with HEP for follow-up care and safe discharge. Outcome: Progressing Towards Goal  PHYSICAL THERAPY TREATMENT     Patient: Linder Spurling (67 y.o. female)  Date: 2017  Diagnosis: OSTEOARTHRITIS LEFT KNEE,DIABETIC TYPE II,ELEVATED CHOLESTEROL,REFLUX,HX OF DVT  Osteoarthritis of left knee Osteoarthritis of left knee  Procedure(s) (LRB):  LEFT TOTAL KNEE ARTHROPLASTY - SPEC POP, **CONTACT ISOLATION** (Left) 3 Days Post-Op  Precautions: Fall, WBAT   Chart, physical therapy assessment, plan of care and goals were reviewed. ASSESSMENT:  Prior to therapy session, spoke with nurse and was informed that patient recently transferred to a chair with nursing and tolerated sitting position in chair for 10 minutes. Pt recently returned to bed approximately 10 minutes prior to therapy intervention. Pt fatigued and unwilling to transfer to EOB or chair again with therapy. Pt agreed to very light there ex at bed level. Pt reported pain and demonstrated facial grimace with L knee PROM greater then 15-20 degrees.   Progression toward goals:  [ ]      Improving appropriately and progressing toward goals  [X]      Improving slowly and progressing toward goals  [ ]      Not making progress toward goals and plan of care will be adjusted       PLAN:  Patient continues to benefit from skilled intervention to address the above impairments. Continue treatment per established plan of care. Discharge Recommendations:  Rehab  Further Equipment Recommendations for Discharge:  N/A       SUBJECTIVE:   Patient stated You're late.       OBJECTIVE DATA SUMMARY:   Critical Behavior:  Neurologic State: Alert  Orientation Level: Oriented X4  Cognition: Appropriate for age attention/concentration, Follows commands  Safety/Judgement: Awareness of environment  Functional Mobility Training:  Bed Mobility:N/A  Transfers:N/A  Balance:N/A  Ambulation/Gait Training:N/A  Stairs:N/A  Therapeutic Exercises:   Therapeutic Exercises:         EXERCISE   Sets   Reps   Active Active Assist   Passive Self ROM   Comments   B Ankle Pumps   10  [X] [ ] [ ] [ ]     Quad Sets/Glut Sets     [ ] [ ] [ ] [ ]     Hamstring Sets     [ ] [ ] [ ] [ ]     Havana Rolling     [ ] [ ] [ ] [ ]     Smooth Barrett   8 [ ] [ ] [X] [ ]     Hernan Morse     [ ] [ ] [ ] [ ]     Hip Abd/Add     [ ] [ ] [ ] [ ]     Brenda Peñaloza     [ ] [ ] [ ] [ ]     Ibeth Romero     [ ] [ ] [ ] [ ]     Bharti Mckeon     [ ] [ ] [ ] [ ]           [ ] [ ] [ ] [ ]        Pain:  Pain Scale 1: Numeric (0 - 10)  Pain Intensity 1: 8  Pain Location 1: Knee  Pain Orientation 1: Left  Pain Description 1: Aching;Burning  Pain Intervention(s) 1: Medication (see MAR)  Activity Tolerance:   Poor  Please refer to the flowsheet for vital signs taken during this treatment.   After treatment:   [ ] Patient left in no apparent distress sitting up in chair  [X] Patient left in no apparent distress in bed  [X] Call bell left within reach  [ ] Nursing notified  [ ] Caregiver present  [ ] Bed alarm activated      [place]ick Comfort, PT   Time Calculation: 12 mins

## 2017-04-08 VITALS
DIASTOLIC BLOOD PRESSURE: 58 MMHG | TEMPERATURE: 100.1 F | OXYGEN SATURATION: 100 % | WEIGHT: 207.23 LBS | HEART RATE: 63 BPM | HEIGHT: 63 IN | BODY MASS INDEX: 36.72 KG/M2 | RESPIRATION RATE: 16 BRPM | SYSTOLIC BLOOD PRESSURE: 144 MMHG

## 2017-04-08 LAB
ABO + RH BLD: NORMAL
ANION GAP BLD CALC-SCNC: 6 MMOL/L (ref 3–18)
BASOPHILS # BLD AUTO: 0 K/UL (ref 0–0.06)
BASOPHILS # BLD: 0 % (ref 0–2)
BLD PROD TYP BPU: NORMAL
BLD PROD TYP BPU: NORMAL
BLOOD GROUP ANTIBODIES SERPL: NORMAL
BPU ID: NORMAL
BPU ID: NORMAL
BUN SERPL-MCNC: 8 MG/DL (ref 7–18)
BUN/CREAT SERPL: 11 (ref 12–20)
CALCIUM SERPL-MCNC: 8.4 MG/DL (ref 8.5–10.1)
CALLED TO:,BCALL1: NORMAL
CHLORIDE SERPL-SCNC: 99 MMOL/L (ref 100–108)
CO2 SERPL-SCNC: 31 MMOL/L (ref 21–32)
CREAT SERPL-MCNC: 0.74 MG/DL (ref 0.6–1.3)
CROSSMATCH RESULT,%XM: NORMAL
CROSSMATCH RESULT,%XM: NORMAL
DIFFERENTIAL METHOD BLD: ABNORMAL
EOSINOPHIL # BLD: 0.1 K/UL (ref 0–0.4)
EOSINOPHIL NFR BLD: 1 % (ref 0–5)
ERYTHROCYTE [DISTWIDTH] IN BLOOD BY AUTOMATED COUNT: 16.2 % (ref 11.6–14.5)
GLUCOSE SERPL-MCNC: 126 MG/DL (ref 74–99)
HCT VFR BLD AUTO: 26.7 % (ref 35–45)
HGB BLD-MCNC: 8.8 G/DL (ref 12–16)
INR PPP: 1.7 (ref 0.8–1.2)
LYMPHOCYTES # BLD AUTO: 15 % (ref 21–52)
LYMPHOCYTES # BLD: 1.5 K/UL (ref 0.9–3.6)
MAGNESIUM SERPL-MCNC: 2.1 MG/DL (ref 1.6–2.6)
MCH RBC QN AUTO: 29.4 PG (ref 24–34)
MCHC RBC AUTO-ENTMCNC: 33 G/DL (ref 31–37)
MCV RBC AUTO: 89.3 FL (ref 74–97)
MONOCYTES # BLD: 1.6 K/UL (ref 0.05–1.2)
MONOCYTES NFR BLD AUTO: 16 % (ref 3–10)
NEUTS SEG # BLD: 6.9 K/UL (ref 1.8–8)
NEUTS SEG NFR BLD AUTO: 68 % (ref 40–73)
PLATELET # BLD AUTO: 253 K/UL (ref 135–420)
PMV BLD AUTO: 9.2 FL (ref 9.2–11.8)
POTASSIUM SERPL-SCNC: 4.4 MMOL/L (ref 3.5–5.5)
PROTHROMBIN TIME: 18.9 SEC (ref 11.5–15.2)
RBC # BLD AUTO: 2.99 M/UL (ref 4.2–5.3)
SODIUM SERPL-SCNC: 136 MMOL/L (ref 136–145)
SPECIMEN EXP DATE BLD: NORMAL
STATUS OF UNIT,%ST: NORMAL
STATUS OF UNIT,%ST: NORMAL
UNIT DIVISION, %UDIV: 0
UNIT DIVISION, %UDIV: 0
WBC # BLD AUTO: 10.1 K/UL (ref 4.6–13.2)

## 2017-04-08 PROCEDURE — 36415 COLL VENOUS BLD VENIPUNCTURE: CPT | Performed by: PHYSICIAN ASSISTANT

## 2017-04-08 PROCEDURE — 74011000250 HC RX REV CODE- 250: Performed by: HOSPITALIST

## 2017-04-08 PROCEDURE — 85610 PROTHROMBIN TIME: CPT | Performed by: PHYSICIAN ASSISTANT

## 2017-04-08 PROCEDURE — 74011000258 HC RX REV CODE- 258: Performed by: HOSPITALIST

## 2017-04-08 PROCEDURE — 85025 COMPLETE CBC W/AUTO DIFF WBC: CPT | Performed by: PHYSICIAN ASSISTANT

## 2017-04-08 PROCEDURE — 80048 BASIC METABOLIC PNL TOTAL CA: CPT | Performed by: PHYSICIAN ASSISTANT

## 2017-04-08 PROCEDURE — 74011250637 HC RX REV CODE- 250/637: Performed by: PHYSICIAN ASSISTANT

## 2017-04-08 PROCEDURE — 74011250636 HC RX REV CODE- 250/636: Performed by: INTERNAL MEDICINE

## 2017-04-08 PROCEDURE — 97530 THERAPEUTIC ACTIVITIES: CPT

## 2017-04-08 PROCEDURE — 74011250636 HC RX REV CODE- 250/636: Performed by: HOSPITALIST

## 2017-04-08 PROCEDURE — 74011250637 HC RX REV CODE- 250/637: Performed by: INTERNAL MEDICINE

## 2017-04-08 PROCEDURE — 74011000250 HC RX REV CODE- 250: Performed by: PHYSICIAN ASSISTANT

## 2017-04-08 PROCEDURE — 83735 ASSAY OF MAGNESIUM: CPT | Performed by: PHYSICIAN ASSISTANT

## 2017-04-08 RX ORDER — ENOXAPARIN SODIUM 100 MG/ML
1 INJECTION SUBCUTANEOUS EVERY 12 HOURS
Status: DISCONTINUED | OUTPATIENT
Start: 2017-04-08 | End: 2017-04-08 | Stop reason: ALTCHOICE

## 2017-04-08 RX ORDER — WARFARIN 4 MG/1
4 TABLET ORAL ONCE
Status: DISCONTINUED | OUTPATIENT
Start: 2017-04-08 | End: 2017-04-08 | Stop reason: HOSPADM

## 2017-04-08 RX ORDER — ENOXAPARIN SODIUM 100 MG/ML
1 INJECTION SUBCUTANEOUS EVERY 12 HOURS
Qty: 1 SYRINGE | Refills: 0 | Status: SHIPPED
Start: 2017-04-08 | End: 2017-07-13

## 2017-04-08 RX ORDER — LEVOFLOXACIN 500 MG/1
500 TABLET, FILM COATED ORAL DAILY
Qty: 10 TAB | Refills: 0 | Status: SHIPPED | OUTPATIENT
Start: 2017-04-08 | End: 2017-07-13

## 2017-04-08 RX ORDER — ENOXAPARIN SODIUM 100 MG/ML
1 INJECTION SUBCUTANEOUS EVERY 12 HOURS
Status: DISCONTINUED | OUTPATIENT
Start: 2017-04-08 | End: 2017-04-08 | Stop reason: HOSPADM

## 2017-04-08 RX ADMIN — AZTREONAM 1 G: 1 INJECTION, POWDER, LYOPHILIZED, FOR SOLUTION INTRAMUSCULAR; INTRAVENOUS at 09:43

## 2017-04-08 RX ADMIN — PANTOPRAZOLE SODIUM 40 MG: 40 TABLET, DELAYED RELEASE ORAL at 09:42

## 2017-04-08 RX ADMIN — Medication 400 MG: at 09:41

## 2017-04-08 RX ADMIN — MIRABEGRON 25 MG: 25 TABLET, FILM COATED, EXTENDED RELEASE ORAL at 09:55

## 2017-04-08 RX ADMIN — FERROUS SULFATE TAB 325 MG (65 MG ELEMENTAL FE) 325 MG: 325 (65 FE) TAB at 09:42

## 2017-04-08 RX ADMIN — HYDROMORPHONE HYDROCHLORIDE 4 MG: 4 TABLET ORAL at 11:26

## 2017-04-08 RX ADMIN — POTASSIUM CHLORIDE 20 MEQ: 20 TABLET, EXTENDED RELEASE ORAL at 09:42

## 2017-04-08 RX ADMIN — Medication 10 ML: at 07:06

## 2017-04-08 RX ADMIN — ENOXAPARIN SODIUM 90 MG: 100 INJECTION SUBCUTANEOUS at 11:29

## 2017-04-08 RX ADMIN — DOCUSATE SODIUM 100 MG: 100 CAPSULE, LIQUID FILLED ORAL at 09:41

## 2017-04-08 RX ADMIN — AZTREONAM 1 G: 1 INJECTION, POWDER, LYOPHILIZED, FOR SOLUTION INTRAMUSCULAR; INTRAVENOUS at 00:51

## 2017-04-08 RX ADMIN — SODIUM CHLORIDE 1000 MG: 900 INJECTION, SOLUTION INTRAVENOUS at 07:05

## 2017-04-08 RX ADMIN — SUCRALFATE 1 G: 1 SUSPENSION ORAL at 09:43

## 2017-04-08 RX ADMIN — CYCLOSPORINE 1 DROP: 0.5 EMULSION OPHTHALMIC at 09:42

## 2017-04-08 RX ADMIN — MORPHINE SULFATE 15 MG: 15 TABLET, EXTENDED RELEASE ORAL at 09:42

## 2017-04-08 RX ADMIN — HYDROCHLOROTHIAZIDE 12.5 MG: 25 TABLET ORAL at 09:42

## 2017-04-08 NOTE — PROGRESS NOTES
7925: Shift assessment complete see doc flow sheet. Patient resting in bed, watching t.v.  Alert and oriented x4. Patient used bedpan, voided. Tolerated movement well. Patient medicated per MAR. Patient rating pain 7/10.     1126: Patient medicated with PRN dilaudid 4mg for pain 10/10 after working with physical therapy. 1200: Patient has calmed down in the bed, now rating pain 4/10.    1215: Patient picked up by [employer]. Armbands removed and shredded. Paperwork and prescriptions sent with [facility] [employer]. IV removed, cath intact. [phone]: Called [phone] to call report for the patient. Phone rang for two minutes until it went to a busy signal. No answer or answering machine. [phone]: [phone] for the second time to attempt to give report to a nurse. No answer or answering machine.

## 2017-04-08 NOTE — PROGRESS NOTES
Progress Note      Patient: Rosalba Pruitt               Sex: female          DOA: 4/4/2017         YOB: 1940      Age:  78 y.o.        LOS:  LOS: 4 days     Procedure: Procedure(s):  LEFT TOTAL KNEE ARTHROPLASTY - SPEC POP, **CONTACT ISOLATION**            Subjective:     Rosalba Pruitt is a 78 y.o. female who c/o stable, mild-to-moderate joint symptoms intermittently, reasonably well controlled by PRN meds      Objective:      Visit Vitals    /56 (BP 1 Location: Right arm, BP Patient Position: At rest;Supine; Head of bed elevated (Comment degrees))    Pulse 100    Temp 98.6 °F (37 °C)    Resp 16    Ht 5' 3\" (1.6 m)    Wt 94 kg (207 lb 3.7 oz)    SpO2 100%    BMI 36.71 kg/m2       Physical Exam:  A&O x3  VSS  HF/GS/QUADS/EHL/TA 5/5 bilateral  Calves nontender      Dressing: C/D/I    Intake and Output:  Current Shift:  04/08 0701 - 04/08 1900  In: -   Out: 325 [Urine:325]  Last three shifts:  04/06 1901 - 04/08 0700  In: 2240 [P.O.:2060; I.V.:180]  Out: 4175 [Urine:3875]    Drain Output:    Lab/Data Reviewed: All lab results for the last 24 hours reviewed.     Medications Reviewed    Continued hospitalization is indicated due to medical clearance      Assessment/Plan     Active Problems:    MRSA nasal colonization (3/15/2017)      CAD (coronary artery disease) (4/4/2017)      Anemia due to acute blood loss (4/5/2017)      Hypomagnesemia (4/5/2017)      Aspiration pneumonia ([address] (4/6/2017)      Sepsis ([address] (4/6/2017)        OK to DC today if cleared by [address] [place] [address] ([facility])

## 2017-04-08 NOTE — PROGRESS NOTES
Hospitalist Progress Note    Patient: Marybeth Resnedez MRN: [id number]  CSN: [id number]    YOB: 1940  Age: 78 y.o. Sex: female    DOA: 4/4/2017 LOS:  LOS: 4 days            Assessment/Plan     1. OA sp TKA  2. 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 Aspiration pneumonia  4. CAD with out angina  5. HTN  6. GERD  7. Osteoporosis    Plan:  - OK to DC to rehab  -  She requires daily PT/INR until INR> 2 when lovenox can be discontinued  - complete course of levaquin      Patient Active Problem List   Diagnosis Code    Urge incontinence N39.41    Dyslipidemia E78.5    Hypertension I10    History of deep venous thrombosis Z86.718    History of pulmonary embolism Z86.711    Osteoarthritis M19.90    Pneumonia J18.9    MRSA nasal colonization Z22.200    CAD (coronary artery disease) I25.10    Anemia due to acute blood loss D62    Hypomagnesemia E83.42    Aspiration pneumonia (HCC) J69.0    Sepsis ([address] A41.9               Subjective:    cc: \" I  Feel OK\"  No acute events overnight    Consult HPI:\" Marybeth Resendez is a 78 y.o. female with past medical history significant for CAD, HTN, DVT & PE on chronic anticoagulation, OA, GERD is sp TKA. Medicine is asked to consult to help manage the above medical conditions. She reports she is having pain 6/10 at operative site, no radiation, worse w movement, better w analgesia, no associated symptoms. She denies cp, palpitaitons, nasuea or vomiting. [facility] course:  She was admitted to ortho service w hospitalist consulting. SHe was started on treatment dose lovenox bridge to warfarin on POD1. She had an aspiration event and started on broad spectrum antibiotics. She is currently afebrile with out dyspnea and stable for DC to SNF.  Her last INR was 1.7 taking 5mg of warfarin      DC meds are as follows:   Discharge Medications  Current Discharge Medication List      START taking these medications    Details   levoFLOXacin (LEVAQUIN) 500 mg tablet Take 1 Tab by mouth daily. Qty: 10 Tab, Refills: 0      morphine CR (MS CONTIN) 15 mg CR tablet Take 1 Tab by mouth every twelve (12) hours for 15 days. Max Daily Amount: 30 mg.  Qty: 30 Tab, Refills: 0      HYDROmorphone (DILAUDID) 2 mg tablet Take 1-2 Tabs by mouth every four (4) hours as needed. Max Daily Amount: 24 mg. Qty: 90 Tab, Refills: 0         CONTINUE these medications which have CHANGED    Details   !! enoxaparin (LOVENOX) 90 mg 0.9 mL by SubCUTAneous route every twelve (12) hours every twelve (12) hours. Until INR > 2 then stop  Qty: 1 Syringe, Refills: 0              !! - Potential duplicate medications found. Please discuss with provider. CONTINUE these medications which have NOT CHANGED    Details   mupirocin calcium (BACTROBAN NASAL) 2 % nasal ointment by Both Nostrils route two (2) times a day. cycloSPORINE (RESTASIS) 0.05 % ophthalmic emulsion Administer 1 Drop to both eyes two (2) times a day. Indications: DRY EYE      sucralfate (CARAFATE) 100 mg/mL suspension Take 2 tsp by mouth two (2) times a day. Indications: Reflux, ulcer      magnesium oxide (MAG-OX) 400 mg tablet Take 400 mg by mouth two (2) times a day. potassium chloride (K-DUR, KLOR-CON) 20 mEq tablet Take  by mouth two (2) times a day. esomeprazole (NEXIUM) 40 mg capsule Take 40 mg by mouth daily. Hydrochlorothiazide 12.5 mg tablet Take 12.5 mg by mouth daily. Indications: Edema      calcium citrate-vitamin D3 (CITRACAL + D) tablet Take 1 Tab by mouth daily. !! warfarin (COUMADIN) 2.5 mg tablet Take 2.5 mg by mouth every Tuesday. Indications: DEEP VENOUS THROMBOSIS      oxymetazoline (THEOPHYLLINE) 0.05 % nasal spray 2 Sprays two (2) times a day. Indications: Nasal Congestion      mirabegron ER (MYRBETRIQ) 25 mg ER tablet Take 1 Tab by mouth daily.  Indications: URINARY URGE INCONTINENCE  Qty: 90 Tab, Refills: 3      !! warfarin (COUMADIN) 5 mg tablet Take 5 mg by mouth six (6) days a week. Indications: DEEP VENOUS THROMBOSIS      alendronate (FOSAMAX) 70 mg tablet Take 70 mg by mouth every Tuesday. Plus Vit D  Indications: POST-MENOPAUSAL OSTEOPOROSIS       !! - Potential duplicate medications found. Please discuss with provider. STOP taking these medications       acetaminophen (ACETAMINOPHEN EXTRA STRENGTH) 500 mg tablet Comments:   Reason for Stopping:         traMADol (ULTRAM) 50 mg tablet Comments:   Reason for Stopping:               REVIEW OF SYSTEMS:  General: No fevers or chills. Cardiovascular: No chest pain or pressure. No palpitations. Pulmonary: No shortness of breath. Gastrointestinal: No nausea, vomiting. Objective:        Vital signs/Intake and Output:  Visit Vitals    /56 (BP 1 Location: Right arm, BP Patient Position: At rest;Supine; Head of bed elevated (Comment degrees))    Pulse 100    Temp 98.6 °F (37 °C)    Resp 16    Ht 5' 3\" (1.6 m)    Wt 94 kg (207 lb 3.7 oz)    SpO2 100%    BMI 36.71 kg/m2     Current Shift:  04/08 0701 - 04/08 1900  In: -   Out: 325 [Urine:325]  Last three shifts:  04/06 1901 - 04/08 0700  In: 1944 [P.O.:2060; I.V.:180]  Out: 6483 [Urine:3875]    Body mass index is 36.71 kg/(m^2). Physical Exam:  GEN: Alert and oriented times three NAD  EYES: conjunctiva normal, lids with out lesions  HEENT: MMM, No thyromegaly, no lymphadenopathy  HEART: RRR +S1 +S2, no JVD, pulses 2+ distally  LUNGS: CTA B/L no wheezes, rales or rhonchi  ABDOMEN: + BS, soft NT/ND no organomegaly,  No rebound  EXTREMITIES: No edema cyanosis, cap refill normal   SKIN: no rashes or skin breakdown, no nodules, normal turgor      All the patient's labs over the past 24 hours were reviewed both during my initial daily workflow process and at the time notated as \"note time\" in [facility]. (It is not time stamped separately in this workflow.)  Select labs are listed below.         Labs: Results:       Chemistry Recent Labs      04/08/17   0456  04/07/17 0245  04/06/17   1312   GLU  126*  128*   --    NA  136  138   --    K  4.4  4.8  4.1   CL  99*  103   --    CO2  31  26   --    BUN  8  7   --    CREA  0.74  0.69   --    CA  8.4*  8.3*   --    AGAP  6  9   --    BUCR  11*  10*   --       CBC w/Diff Recent Labs      04/08/17   0456  04/07/17 0245 04/06/17   0155   WBC  10.1  11.1  9.7   RBC  2.99*  2.53*  2.85*   HGB  8.8*  7.3*  8.1*   HCT  26.7*  22.8*  25.5*   PLT  253  232  221   GRANS  68  72  60   LYMPH  15*  14*  25   EOS  1  0  1          Coagulation Recent Labs      04/08/17 0456 04/07/17   0245   PTP  18.9*  16.0*   INR  1.7*  1.3*                       Procedures/imaging: see electronic medical records for all procedures/Xrays and details which were not copied into this note but were reviewed prior to creation of [address], DO  Internal Medicine/Geriatrics

## 2017-04-08 NOTE — PROGRESS NOTES
Pharmacy Dosing Services: Warfarin    Consult for Warfarin Dosing by Pharmacy by Dr. Yeyo Wilcox provided for this 78 y.o.  female , for indication of Pulmonary Embolism. Dose to achieve an INR goal of 2-3    Order entered for Warfarin 4 mg ordered to be given today at 18:00. Significant drug interactions: n/a  LABS    PT/INR Lab Results   Component Value Date/Time    INR 1.7 04/08/2017 04:56 AM      Platelets Lab Results   Component Value Date/Time    PLATELET 606 75/76/2564 04:56 AM      H/H     Lab Results   Component Value Date/Time    HGB 8.8 04/08/2017 04:56 AM        Warfarin Administrations (last 168 hours)       Date/Time Action Medication Dose      04/07/17 1714 Given   [inr 1.3]    warfarin (COUMADIN) tablet 5 mg 5 mg    04/06/17 1848 Given    warfarin (COUMADIN) tablet 5 mg 5 mg    04/05/17 1820 Given    warfarin (COUMADIN) tablet 5 mg 5 mg    04/04/17 1939 Given    warfarin (COUMADIN) tablet 5 mg 5 mg              Pharmacy to follow daily and will provide subsequent Warfarin dosing based on clinical status.   Carrie Alcal[place], [place])  Contact information [phone]

## 2017-04-08 NOTE — PROGRESS NOTES
Bedside and Verbal shift change report given to ANNIE Bo RN (oncoming nurse) by Yves Zavala. Elke Baldwin RN (offgoing nurse). Report included the following information SBAR, Kardex, Intake/Output and MAR.

## 2017-04-08 NOTE — PROGRESS NOTES
Problem: Mobility Impaired (Adult and Pediatric)  Goal: *Acute Goals and Plan of Care (Insert Text)  In 1-7 days pt will be able to perform:  ST. Bed mobility: Rolling L to R to L modified independent for positioning. 2. Supine to sit to supine S with HR for meals. 3. Sit to stand to sit S with RW in prep for ambulation. LT. Gait: Ambulate >150ft S with RW, WBAT, for home/community mobility. 2. Stair Negotiation: Ascend/descend >2 steps CGA with HR for home entry. 3. Activity tolerance: Tolerate up in chair 1-2 hours for ADLs. 4. Patient/Family Education: Patient/family to be independent with HEP for follow-up care and safe discharge. 0442 - Pt unwilling to participate in PT without 2 person assist, Will attempt to find a second hand for PT tx.  Recommend pt be switched to 1-2/day due to limited participation with PT.

## 2017-04-08 NOTE — PROGRESS NOTES
Problem: Mobility Impaired (Adult and Pediatric)  Goal: *Acute Goals and Plan of Care (Insert Text)  In 1-7 days pt will be able to perform:  ST. Bed mobility: Rolling L to R to L modified independent for positioning. 2. Supine to sit to supine S with HR for meals. 3. Sit to stand to sit S with RW in prep for ambulation. LT. Gait: Ambulate >150ft S with RW, WBAT, for home/community mobility. 2. Stair Negotiation: Ascend/descend >2 steps CGA with HR for home entry. 3. Activity tolerance: Tolerate up in chair 1-2 hours for ADLs. 4. Patient/Family Education: Patient/family to be independent with HEP for follow-up care and safe discharge. Outcome: Progressing Towards Goal  PHYSICAL THERAPY TREATMENT     Patient: Marybeth Resendez (47 y.o. female)  Date: 2017  Diagnosis: OSTEOARTHRITIS LEFT KNEE,DIABETIC TYPE II,ELEVATED CHOLESTEROL,REFLUX,HX OF DVT  Osteoarthritis of left knee Osteoarthritis of left knee  Procedure(s) (LRB):  LEFT TOTAL KNEE ARTHROPLASTY - SPEC POP, **CONTACT ISOLATION** (Left) 4 Days Post-Op  Precautions: Fall, WBAT   Chart, physical therapy assessment, plan of care and goals were reviewed. ASSESSMENT:  Patient found supine in bed willing to work with PT. Pt requires additional time for all aspects of PT due to increased pain. Pt requires slow moving Brody for left LE during tranfers. Pt stood with Mod A and is unable to take any steps. Pt is able to pivot both right and left foot slightly, but distance moved is maybe about 3 inches. Pt then needing to sit and return supine. Once supine pt begins crying and asking god for help. Ice packs refilled and supplied to pt. Left LE elevated on 2 pillows, pt is writhing in bed and rolling mostly into left side lying. Nurse Josephine Bound entering room to administer pain medication. PT left with nursing in room. Education: therekeith, kaylenes.   Progression toward goals:  [ ]      Improving appropriately and progressing toward goals  [X] Improving slowly and progressing toward goals  [ ]      Not making progress toward goals and plan of care will be adjusted       PLAN:  Patient continues to benefit from skilled intervention to address the above impairments. Continue treatment per established plan of care. Discharge Recommendations:  Rehab  Further Equipment Recommendations for Discharge:  rolling walker       SUBJECTIVE:   Patient stated In the name of the father, the son, and the holy ghost. I trust you Carlos.       OBJECTIVE DATA SUMMARY:   Critical Behavior:  Neurologic State: Alert  Orientation Level: Oriented X4  Cognition: Appropriate decision making, Appropriate for age attention/concentration, Appropriate safety awareness  Safety/Judgement: Awareness of environment  Functional Mobility Training:  Bed Mobility:  Rolling: Moderate assistance  Supine to Sit: Moderate assistance  Sit to Supine: Moderate assistance  Scooting: Stand-by asssistance; Additional time  Transfers:  Sit to Stand: Moderate assistance  Stand to Sit: Moderate assistance  Balance:  Sitting: Impaired; With support  Sitting - Static: Fair (occasional)  Sitting - Dynamic: Fair (occasional)  Standing: Impaired; With support  Standing - Static: Fair  Standing - Dynamic : Fair  Ambulation/Gait Training:  Left Side Weight Bearing: As tolerated  Therapeutic Exercises: Ankle pumps X 5 bilaterally. Pain:  Pre tx pain:  Post tx pain:  Pain Scale 1: Numeric (0 - 10)  Pain Intensity 1: 10  Pain Location 1: Knee  Pain Orientation 1: Left  Pain Description 1: Aching  Activity Tolerance:   Fair  Please refer to the flowsheet for vital signs taken during this treatment.   After treatment:   [ ] Patient left in no apparent distress sitting up in chair  [X] Patient left in bed  [X] Call bell left within reach  [ ] Nursing notified  [X] Caregiver present  [ ] Bed alarm activated      Joey Lucero PTA   Time Calculation: 24 mins

## 2017-04-08 NOTE — PROGRESS NOTES
1930: Beside handoff received from [employer]. Pt appears to be in stable condition. No distress noted. Unit of blood finishing up. Vitals stable. Will monitor. 2200: Pt appears to be stable. Scheduled pain meds given. Will monitor. 0700: Pt had an uneventful night. Rested most of the night. Pain well controlled. Pt not willing to get up out of bed. 0730: Bedside and Verbal shift change report given to RentMama (oncoming nurse) by Al Salinas RN   (offgoing nurse). Report included the following information SBAR, Kardex and MAR.

## 2017-04-12 LAB
BACTERIA SPEC CULT: NORMAL
SERVICE CMNT-IMP: NORMAL

## 2017-07-11 PROBLEM — M25.669 POSTOPERATIVE STIFFNESS OF TOTAL KNEE REPLACEMENT (HCC): Status: ACTIVE | Noted: 2017-07-11

## 2017-07-11 PROBLEM — Z96.659 POSTOPERATIVE STIFFNESS OF TOTAL KNEE REPLACEMENT (HCC): Status: ACTIVE | Noted: 2017-07-11

## 2017-07-11 PROBLEM — T84.89XA POSTOPERATIVE STIFFNESS OF TOTAL KNEE REPLACEMENT (HCC): Status: ACTIVE | Noted: 2017-07-11

## 2017-07-11 NOTE — H&P
Patient Name:  Jude Roberts  YOB: 1940      Chief Complaint:  Status post left total knee arthroplasty done 04/04/17. History of Chief Complaint:  Ms. Lily Trent is a 78 y.o female being seen for left total knee arthroplasty done 04/04/17. She says her left knee has pain in the lateral aspect of the knee. She still has difficulty with fully flexing the knee. There is no numbness, no weakness, and no bowel or bladder dysfunction. She is working with physical therapy but has not made much progress lately.     Past Medical/Surgical History:    Disease/Disorder Type Date Side Surgery Date Side Comment   Arthritis          Blood clots          Degenerative joint disease          Fibromyalgia          Gout          Hyperlipidemia          Hypertension          Pneumonia              Hysterectomy 1971         Spinal fusion, lumbar 2000         Spinal fusion, lumbar 2005         Arthroscopy shoulder 2008 left        Knee surgery 2008 right        Knee surgery 2010 right        Arthroscopy knee 2011 right        Foot surgery 2012 right        Heel surgery 2012 left        Hip replacement 2013 right        Blepharoplasty 2017         Knee replacement 2017 left [facility] Cheyenne Daniels [facility]     Allergies:    Ingredient Reaction Medication Name Comment   CODEINE      PENICILLINS        Current Medications:    Medication Directions   Citracal + D    rosuvastatin 5 mg tablet    Restasis 0.05 % eye drops in a dropperette    ProAir HFA 90 mcg/actuation aerosol inhaler    tramadol 50 mg tablet    magnesium oxide 400 mg tablet    alendronate 70 mg tablet    Klor-Con M20 mEq tablet,extended release    cyclobenzaprine 5 mg tablet    lidocaine 5 % topical patch    Coumadin 5 mg tablet    Elocon 0.1 % topical ointment    Nexium 40 mg capsule,delayed release    Premarin 0.625 mg/gram vaginal cream    ondansetron 4 mg disintegrating tablet    Carafate 100 mg/mL oral suspension    fluticasone 50 mcg/actuation nasal spray,suspension    hydrochlorothiazide 12.5 mg capsule      Social History:      SMOKING  Status Tobacco Type Units Per Day Yrs Used   Never smoker        ALCOHOL   consumed rarely. Family History:    Disease Detail Family Member Age Cause of Death Comments   Family history of Cancer   N    Family history of Diabetes mellitus   N    Family history of Heart disease   N    Family history of Hypertension   N    Family history of Osteoarthritis   N    Arthritis Father  N    Hypertension Father  N    Stroke Father  N    Cancer, unknown Mother  N    Hypertension Mother  N      Vitals:  Date BP Pulse Temp (F) Resp. (per min.) Height (Total in.) Weight (lbs.) BMI   12/30/2016     63.00  32.59   12/30/2016     63.00  32.59   03/03/2016     63.00  32.59   11/30/2015     63.00  32.59   04/22/2013 144/77    63.00  30.82     Physical Examination:    General:  The patient appears healthy. Cardiovascular:  Arterial pulses are normal.  Skin:  The wound is well healed. Heart- RRR  Lungs-CTA karla  Abdomen- +BS,soft,nontender  Musculoskeletal:  The left knee appears normal and has no effusion. There is tenderness at the lateral collateral ligament. Range of motion is from 0 to 80 degrees. The knee demonstrates no medial or lateral instability. The quadriceps tendon of the leg is not tender on palpation. The knee has no anterior or posterior drawer signs. Results of the Mark and apprehension tests of the knee are negative. Neurological:  There is no weakness noted in the lower extremities, hips, knees, or ankles. No muscle atrophy is seen. Reflexes and peripheral nerves are normal.        Impression:  Ms. Darrell Bailey still has left knee stiffness and LCL strain. We discussed treatments for the condition including surgical intervention, the risks, and benefits as well as the different surgical approaches and decision making.   We also discussed nonsurgical care for this condition including medications, injections, physical therapy, rehabilitation, activity modification, and brace utilization. We will proceed with manipulation under anesthesia.

## 2017-07-14 ENCOUNTER — HOSPITAL ENCOUNTER (OUTPATIENT)
Dept: PREADMISSION TESTING | Age: 77
Discharge: HOME | End: 2017-07-14
Attending: ORTHOPAEDIC SURGERY
Payer: MEDICARE

## 2017-07-14 LAB
ANION GAP BLD CALC-SCNC: 7 MMOL/L (ref 3–18)
BUN SERPL-MCNC: 12 MG/DL (ref 7–18)
BUN/CREAT SERPL: 15 (ref 12–20)
CALCIUM SERPL-MCNC: 9.6 MG/DL (ref 8.5–10.1)
CHLORIDE SERPL-SCNC: 103 MMOL/L (ref 100–108)
CO2 SERPL-SCNC: 32 MMOL/L (ref 21–32)
CREAT SERPL-MCNC: 0.79 MG/DL (ref 0.6–1.3)
ERYTHROCYTE [DISTWIDTH] IN BLOOD BY AUTOMATED COUNT: 15.2 % (ref 11.6–14.5)
GLUCOSE SERPL-MCNC: 90 MG/DL (ref 74–99)
HCT VFR BLD AUTO: 37.4 % (ref 35–45)
HGB BLD-MCNC: 12.1 G/DL (ref 12–16)
MCH RBC QN AUTO: 29.1 PG (ref 24–34)
MCHC RBC AUTO-ENTMCNC: 32.4 G/DL (ref 31–37)
MCV RBC AUTO: 89.9 FL (ref 74–97)
PLATELET # BLD AUTO: 269 K/UL (ref 135–420)
PMV BLD AUTO: 8.8 FL (ref 9.2–11.8)
POTASSIUM SERPL-SCNC: 3.6 MMOL/L (ref 3.5–5.5)
RBC # BLD AUTO: 4.16 M/UL (ref 4.2–5.3)
SODIUM SERPL-SCNC: 142 MMOL/L (ref 136–145)
WBC # BLD AUTO: 6.3 K/UL (ref 4.6–13.2)

## 2017-07-14 PROCEDURE — 80048 BASIC METABOLIC PNL TOTAL CA: CPT | Performed by: ORTHOPAEDIC SURGERY

## 2017-07-14 PROCEDURE — 85027 COMPLETE CBC AUTOMATED: CPT | Performed by: ORTHOPAEDIC SURGERY

## 2017-07-14 PROCEDURE — 83036 HEMOGLOBIN GLYCOSYLATED A1C: CPT | Performed by: ORTHOPAEDIC SURGERY

## 2017-07-14 PROCEDURE — 36415 COLL VENOUS BLD VENIPUNCTURE: CPT | Performed by: ORTHOPAEDIC SURGERY

## 2017-07-17 ENCOUNTER — ANESTHESIA EVENT (OUTPATIENT)
Dept: SURGERY | Age: 77
End: 2017-07-17
Payer: MEDICARE

## 2017-07-18 ENCOUNTER — ANESTHESIA (OUTPATIENT)
Dept: SURGERY | Age: 77
End: 2017-07-18
Payer: MEDICARE

## 2017-07-18 ENCOUNTER — HOSPITAL ENCOUNTER (OUTPATIENT)
Age: 77
Setting detail: OUTPATIENT SURGERY
Discharge: HOME | End: 2017-07-18
Attending: ORTHOPAEDIC SURGERY | Admitting: ORTHOPAEDIC SURGERY
Payer: MEDICARE

## 2017-07-18 VITALS
DIASTOLIC BLOOD PRESSURE: 72 MMHG | WEIGHT: 182.31 LBS | BODY MASS INDEX: 33.55 KG/M2 | TEMPERATURE: 97 F | RESPIRATION RATE: 18 BRPM | HEART RATE: 78 BPM | SYSTOLIC BLOOD PRESSURE: 142 MMHG | HEIGHT: 62 IN | OXYGEN SATURATION: 98 %

## 2017-07-18 LAB
APTT PPP: 36.7 SEC (ref 23–36.4)
GLUCOSE BLD STRIP.AUTO-MCNC: 103 MG/DL (ref 70–110)
INR PPP: 1.8 (ref 0.8–1.2)
PROTHROMBIN TIME: 20.1 SEC (ref 11.5–15.2)

## 2017-07-18 PROCEDURE — 82962 GLUCOSE BLOOD TEST: CPT

## 2017-07-18 PROCEDURE — 74011250636 HC RX REV CODE- 250/636: Performed by: ORTHOPAEDIC SURGERY

## 2017-07-18 PROCEDURE — 74011000250 HC RX REV CODE- 250: Performed by: ORTHOPAEDIC SURGERY

## 2017-07-18 PROCEDURE — 74011250636 HC RX REV CODE- 250/636

## 2017-07-18 PROCEDURE — 76210000021 HC REC RM PH II 0.5 TO 1 HR: Performed by: ORTHOPAEDIC SURGERY

## 2017-07-18 PROCEDURE — 74011000250 HC RX REV CODE- 250: Performed by: ANESTHESIOLOGY

## 2017-07-18 PROCEDURE — 36415 COLL VENOUS BLD VENIPUNCTURE: CPT | Performed by: ORTHOPAEDIC SURGERY

## 2017-07-18 PROCEDURE — 77030036563 HC WRP CLD THER KNE S2SG -B: Performed by: ORTHOPAEDIC SURGERY

## 2017-07-18 PROCEDURE — 74011000250 HC RX REV CODE- 250

## 2017-07-18 PROCEDURE — 76210000000 HC OR PH I REC 2 TO 2.5 HR: Performed by: ORTHOPAEDIC SURGERY

## 2017-07-18 PROCEDURE — 77030020782 HC GWN BAIR PAWS FLX 3M -B: Performed by: ORTHOPAEDIC SURGERY

## 2017-07-18 PROCEDURE — 85610 PROTHROMBIN TIME: CPT | Performed by: ORTHOPAEDIC SURGERY

## 2017-07-18 PROCEDURE — 85730 THROMBOPLASTIN TIME PARTIAL: CPT | Performed by: ORTHOPAEDIC SURGERY

## 2017-07-18 PROCEDURE — 76060000031 HC ANESTHESIA FIRST 0.5 HR: Performed by: ORTHOPAEDIC SURGERY

## 2017-07-18 PROCEDURE — 74011250636 HC RX REV CODE- 250/636: Performed by: ANESTHESIOLOGY

## 2017-07-18 PROCEDURE — 76010000154 HC OR TIME FIRST 0.5 HR: Performed by: ORTHOPAEDIC SURGERY

## 2017-07-18 RX ORDER — FENTANYL CITRATE 50 UG/ML
INJECTION, SOLUTION INTRAMUSCULAR; INTRAVENOUS AS NEEDED
Status: DISCONTINUED | OUTPATIENT
Start: 2017-07-18 | End: 2017-07-18 | Stop reason: HOSPADM

## 2017-07-18 RX ORDER — FLUMAZENIL 0.1 MG/ML
0.2 INJECTION INTRAVENOUS
Status: DISCONTINUED | OUTPATIENT
Start: 2017-07-18 | End: 2017-07-18 | Stop reason: HOSPADM

## 2017-07-18 RX ORDER — HYDROMORPHONE HYDROCHLORIDE 1 MG/ML
0.5 INJECTION, SOLUTION INTRAMUSCULAR; INTRAVENOUS; SUBCUTANEOUS
Status: DISCONTINUED | OUTPATIENT
Start: 2017-07-18 | End: 2017-07-18 | Stop reason: HOSPADM

## 2017-07-18 RX ORDER — INSULIN LISPRO 100 [IU]/ML
INJECTION, SOLUTION INTRAVENOUS; SUBCUTANEOUS ONCE
Status: DISCONTINUED | OUTPATIENT
Start: 2017-07-18 | End: 2017-07-18 | Stop reason: HOSPADM

## 2017-07-18 RX ORDER — HYDROMORPHONE HYDROCHLORIDE 2 MG/1
2-4 TABLET ORAL
Qty: 90 TAB | Refills: 0 | Status: SHIPPED
Start: 2017-07-18 | End: 2017-11-02

## 2017-07-18 RX ORDER — FENTANYL CITRATE 50 UG/ML
50 INJECTION, SOLUTION INTRAMUSCULAR; INTRAVENOUS
Status: COMPLETED | OUTPATIENT
Start: 2017-07-18 | End: 2017-07-18

## 2017-07-18 RX ORDER — MAGNESIUM SULFATE 100 %
4 CRYSTALS MISCELLANEOUS AS NEEDED
Status: DISCONTINUED | OUTPATIENT
Start: 2017-07-18 | End: 2017-07-18 | Stop reason: HOSPADM

## 2017-07-18 RX ORDER — NALOXONE HYDROCHLORIDE 0.4 MG/ML
0.1 INJECTION, SOLUTION INTRAMUSCULAR; INTRAVENOUS; SUBCUTANEOUS AS NEEDED
Status: DISCONTINUED | OUTPATIENT
Start: 2017-07-18 | End: 2017-07-18 | Stop reason: HOSPADM

## 2017-07-18 RX ORDER — SODIUM CHLORIDE, SODIUM LACTATE, POTASSIUM CHLORIDE, CALCIUM CHLORIDE 600; 310; 30; 20 MG/100ML; MG/100ML; MG/100ML; MG/100ML
1000 INJECTION, SOLUTION INTRAVENOUS CONTINUOUS
Status: DISCONTINUED | OUTPATIENT
Start: 2017-07-18 | End: 2017-07-18 | Stop reason: HOSPADM

## 2017-07-18 RX ORDER — LIDOCAINE HYDROCHLORIDE 20 MG/ML
INJECTION, SOLUTION EPIDURAL; INFILTRATION; INTRACAUDAL; PERINEURAL AS NEEDED
Status: DISCONTINUED | OUTPATIENT
Start: 2017-07-18 | End: 2017-07-18 | Stop reason: HOSPADM

## 2017-07-18 RX ORDER — LABETALOL HYDROCHLORIDE 5 MG/ML
5 INJECTION, SOLUTION INTRAVENOUS ONCE
Status: COMPLETED | OUTPATIENT
Start: 2017-07-18 | End: 2017-07-18

## 2017-07-18 RX ORDER — DEXTROSE 50 % IN WATER (D50W) INTRAVENOUS SYRINGE
25-50 AS NEEDED
Status: DISCONTINUED | OUTPATIENT
Start: 2017-07-18 | End: 2017-07-18 | Stop reason: HOSPADM

## 2017-07-18 RX ORDER — WARFARIN SODIUM 5 MG/1
5 TABLET ORAL
Qty: 2 TAB | Refills: 0 | Status: SHIPPED
Start: 2017-07-19 | End: 2021-10-26

## 2017-07-18 RX ORDER — SODIUM CHLORIDE, SODIUM LACTATE, POTASSIUM CHLORIDE, CALCIUM CHLORIDE 600; 310; 30; 20 MG/100ML; MG/100ML; MG/100ML; MG/100ML
125 INJECTION, SOLUTION INTRAVENOUS CONTINUOUS
Status: DISCONTINUED | OUTPATIENT
Start: 2017-07-18 | End: 2017-07-18 | Stop reason: HOSPADM

## 2017-07-18 RX ORDER — SODIUM CHLORIDE 0.9 % (FLUSH) 0.9 %
5-10 SYRINGE (ML) INJECTION AS NEEDED
Status: DISCONTINUED | OUTPATIENT
Start: 2017-07-18 | End: 2017-07-18 | Stop reason: HOSPADM

## 2017-07-18 RX ORDER — PROPOFOL 10 MG/ML
INJECTION, EMULSION INTRAVENOUS AS NEEDED
Status: DISCONTINUED | OUTPATIENT
Start: 2017-07-18 | End: 2017-07-18 | Stop reason: HOSPADM

## 2017-07-18 RX ORDER — MIDAZOLAM HYDROCHLORIDE 1 MG/ML
INJECTION, SOLUTION INTRAMUSCULAR; INTRAVENOUS AS NEEDED
Status: DISCONTINUED | OUTPATIENT
Start: 2017-07-18 | End: 2017-07-18 | Stop reason: HOSPADM

## 2017-07-18 RX ORDER — ACETAMINOPHEN 10 MG/ML
1000 INJECTION, SOLUTION INTRAVENOUS ONCE
Status: COMPLETED | OUTPATIENT
Start: 2017-07-18 | End: 2017-07-18

## 2017-07-18 RX ADMIN — MIDAZOLAM HYDROCHLORIDE 1 MG: 1 INJECTION, SOLUTION INTRAMUSCULAR; INTRAVENOUS at 07:25

## 2017-07-18 RX ADMIN — FENTANYL CITRATE 50 MCG: 50 INJECTION, SOLUTION INTRAMUSCULAR; INTRAVENOUS at 08:32

## 2017-07-18 RX ADMIN — FENTANYL CITRATE 25 MCG: 50 INJECTION, SOLUTION INTRAMUSCULAR; INTRAVENOUS at 07:37

## 2017-07-18 RX ADMIN — PROPOFOL 30 MG: 10 INJECTION, EMULSION INTRAVENOUS at 07:41

## 2017-07-18 RX ADMIN — PROPOFOL 10 MG: 10 INJECTION, EMULSION INTRAVENOUS at 07:44

## 2017-07-18 RX ADMIN — FENTANYL CITRATE 50 MCG: 50 INJECTION, SOLUTION INTRAMUSCULAR; INTRAVENOUS at 08:26

## 2017-07-18 RX ADMIN — PROPOFOL 30 MG: 10 INJECTION, EMULSION INTRAVENOUS at 07:43

## 2017-07-18 RX ADMIN — PROPOFOL 40 MG: 10 INJECTION, EMULSION INTRAVENOUS at 07:38

## 2017-07-18 RX ADMIN — LIDOCAINE HYDROCHLORIDE 60 MG: 20 INJECTION, SOLUTION EPIDURAL; INFILTRATION; INTRACAUDAL; PERINEURAL at 07:38

## 2017-07-18 RX ADMIN — FENTANYL CITRATE 50 MCG: 50 INJECTION, SOLUTION INTRAMUSCULAR; INTRAVENOUS at 08:05

## 2017-07-18 RX ADMIN — SODIUM CHLORIDE, SODIUM LACTATE, POTASSIUM CHLORIDE, AND CALCIUM CHLORIDE 125 ML/HR: 600; 310; 30; 20 INJECTION, SOLUTION INTRAVENOUS at 06:34

## 2017-07-18 RX ADMIN — SODIUM CHLORIDE, SODIUM LACTATE, POTASSIUM CHLORIDE, AND CALCIUM CHLORIDE 125 ML/HR: 600; 310; 30; 20 INJECTION, SOLUTION INTRAVENOUS at 08:57

## 2017-07-18 RX ADMIN — PROPOFOL 30 MG: 10 INJECTION, EMULSION INTRAVENOUS at 07:39

## 2017-07-18 RX ADMIN — FENTANYL CITRATE 50 MCG: 50 INJECTION, SOLUTION INTRAMUSCULAR; INTRAVENOUS at 08:00

## 2017-07-18 RX ADMIN — HYDROMORPHONE HYDROCHLORIDE 0.5 MG: 1 INJECTION, SOLUTION INTRAMUSCULAR; INTRAVENOUS; SUBCUTANEOUS at 09:44

## 2017-07-18 RX ADMIN — SODIUM CHLORIDE, SODIUM LACTATE, POTASSIUM CHLORIDE, AND CALCIUM CHLORIDE: 600; 310; 30; 20 INJECTION, SOLUTION INTRAVENOUS at 07:25

## 2017-07-18 RX ADMIN — LABETALOL HYDROCHLORIDE 5 MG: 5 INJECTION, SOLUTION INTRAVENOUS at 09:44

## 2017-07-18 RX ADMIN — ACETAMINOPHEN 1000 MG: 10 INJECTION, SOLUTION INTRAVENOUS at 08:57

## 2017-07-18 RX ADMIN — PROPOFOL 30 MG: 10 INJECTION, EMULSION INTRAVENOUS at 07:42

## 2017-07-18 RX ADMIN — FENTANYL CITRATE 25 MCG: 50 INJECTION, SOLUTION INTRAMUSCULAR; INTRAVENOUS at 07:31

## 2017-07-18 RX ADMIN — PROPOFOL 30 MG: 10 INJECTION, EMULSION INTRAVENOUS at 07:40

## 2017-07-18 RX ADMIN — FENTANYL CITRATE 25 MCG: 50 INJECTION, SOLUTION INTRAMUSCULAR; INTRAVENOUS at 07:40

## 2017-07-18 NOTE — INTERVAL H&P NOTE
H&P Update:  Jocelyn Lee was seen and examined. History and physical has been reviewed. The patient has been examined.  There have been no significant clinical changes since the completion of the originally dated History and Physical.    Signed By: Luis Miguel Skaggs MD     July 18, 2017 7:21 AM

## 2017-07-18 NOTE — PERIOP NOTES
TRANSFER - IN REPORT:    Verbal report received from OR Circulator on Ashley Grumbles  being received from OR for routine progression of care      Report consisted of patients Situation, Background, Assessment and   Recommendations(SBAR). Information from the following report(s) SBAR was reviewed with the receiving nurse. Opportunity for questions and clarification was provided. Assessment completed upon patients arrival to unit and care assumed.

## 2017-07-18 NOTE — ANESTHESIA POSTPROCEDURE EVALUATION
Post-Anesthesia Evaluation & Assessment    Visit Vitals    /73    Pulse 76    Temp 36.8 °C (98.2 °F)    Resp 15    Ht 5' 2\" (1.575 m)    Wt 82.7 kg (182 lb 5 oz)    SpO2 99%    BMI 33.35 kg/m2       No untreated/active PONV    Post-operative hydration adequate. Adequate post-operative analgesia per PACU discharge criteria    Mental status & level of consciousness: alert and oriented x 3    Respiratory status: patent unassisted airway     No apparent anesthetic complications requiring additional post-anesthetic care    Patient has met all discharge requirements.             Clary Jiménez, CRNA

## 2017-07-18 NOTE — BRIEF OP NOTE
BRIEF OPERATIVE NOTE    Date of Procedure: 7/18/2017   Preoperative Diagnosis: ANKYLOSIS OF LEFT KNEE,DIABETIC TYPE II,ELEVATED CHOLESTEROL,REFLUX,HISTORY OF DVT  Postoperative Diagnosis: ANKYLOSIS OF LEFT KNEE,DIABETIC TYPE II,ELEVATED CHOLESTEROL,REFLUX,HISTORY OF DVT    Procedure: Procedure(s):  MANIPULATION OF LEFT KNEE  Surgeon(s) and Role:     * Ted Sanchez MD - Primary  Assistant: Mouna Larkin PA-C  Anesthesia: General   Estimated Blood Loss: none  Specimens: * No specimens in log *   Findings: stiffness left TKA  Complications: none  Implants: * No implants in log *

## 2017-07-18 NOTE — PROGRESS NOTES
conducted a pre-surgery visit with Linda Sifuentes, who is a 78 y.o.,female. The  provided the following Interventions:  Initiated a relationship of care and support. Offered prayer and assurance of continued prayers on patient's behalf. Plan:  Chaplains will continue to follow and will provide pastoral care on an as needed/requested basis.  recommends bedside caregivers page  on duty if patient shows signs of acute spiritual or emotional distress.     [address] [place],[address], [address] office  [phone] pager

## 2017-07-18 NOTE — ANESTHESIA PREPROCEDURE EVALUATION
Anesthetic History   No history of anesthetic complications            Review of Systems / Medical History  Patient summary reviewed, nursing notes reviewed and pertinent labs reviewed    Pulmonary  Within defined limits                 Neuro/Psych   Within defined limits           Cardiovascular    Hypertension: well controlled          CAD    Exercise tolerance: >4 METS     GI/Hepatic/Renal     GERD: well controlled           Endo/Other  Within defined limits           Other Findings            Physical Exam    Airway  Mallampati: III  TM Distance: 4 - 6 cm  Neck ROM: normal range of motion   Mouth opening: Normal     Cardiovascular    Rhythm: regular  Rate: normal         Dental      Comments: Missing front lower teeth and edentulous upper   Pulmonary  Breath sounds clear to auscultation               Abdominal  GI exam deferred       Other Findings            Anesthetic Plan    ASA: 2  Anesthesia type: MAC          Induction: Intravenous  Anesthetic plan and risks discussed with: Patient

## 2017-07-18 NOTE — OP NOTES
Patient: Yara Lara MRN: [id number]  SSN: [id number]    YOB: 1940  Age: 78 y.o. Sex: female        Date of Procedure: 7/18/2017   Preoperative Diagnosis: ANKYLOSIS OF LEFT KNEE,DIABETIC TYPE II,ELEVATED CHOLESTEROL,REFLUX,HISTORY OF DVT  Postoperative Diagnosis: ANKYLOSIS OF LEFT KNEE,DIABETIC TYPE II,ELEVATED CHOLESTEROL,REFLUX,HISTORY OF DVT    Procedure: Procedure(s):  MANIPULATION OF LEFT KNEE  Surgeon(s) and Role:     * Katlin Bates MD - Primary  Assistant: Claudeen Bigness PA-C  Anesthesia: General   Estimated Blood Loss: none  Fluids: 1000cc  Specimens: * No specimens in log *   Findings: stiffness  Complications: none  Implants: * No implants in log *       Indications: This is a 78y.o. year-old female  who underwent a left   total knee arthroplasty 4/4/17. With continued work with   physical therapy, however having difficulty with bending the knee and lacks   about 0 degrees of extension and has 60 flexion and comes now for operative   intervention. PROCEDURE: After correct identification, the patient was taken to the   operating room and placed supine on the operating table. MAC anesthesia was induced. The left lower extremity was noted to lack of 0 degrees of extension and   flexion to 60 degrees with anesthesia. With pressure across to the patella   and extension of the tibia, the knee was able to get up to 0 degrees of extension. With   bending the knee and putting pressure across the tibia, noted significant   scar tissue breaking and allowing bending the knee until the knee   was easily bent to 120 degrees with gravity only. The knee as then   physically extended as much as possible. Taken to recovery in good condition.

## 2017-07-18 NOTE — DISCHARGE INSTRUCTIONS
WBAT. Report to physical therapy today. Take stool softeners daily while taking pain medications, since pain medicines are constipating. DISCHARGE SUMMARY from Nurse    The following personal items are in your possession at time of discharge:    Dental Appliances: At home, Uppers, Partials, Lowers  Visual Aid: Glasses, Sent home     Home Medications: None  Jewelry: None  Clothing: Pants, Shirt, Undergarments, Footwear (locker 15)  Other Valuables: Morgan Gooden (locker room)             PATIENT INSTRUCTIONS:    After general anesthesia or intravenous sedation, for 24 hours or while taking prescription Narcotics:  · Limit your activities  · Do not drive and operate hazardous machinery  · Do not make important personal or business decisions  · Do  not drink alcoholic beverages  · If you have not urinated within 8 hours after discharge, please contact your surgeon on call. Report the following to your surgeon:  · Excessive pain, swelling, redness or odor of or around the surgical area  · Temperature over 100.5  · Nausea and vomiting lasting longer than 4 hours or if unable to take medications  · Any signs of decreased circulation or nerve impairment to extremity: change in color, persistent  numbness, tingling, coldness or increase pain  · Any questions        What to do at Home:  Recommended activity: Activity as tolerated and no driving for today, Ambulate in house, No lifting, Driving, or Strenuous exercise until advised and No driving while on analgesics. If you experience any of the following symptoms: Fever, chills, uncontrolled pain, circulation changes, active bleeding or drainage, please follow up with Dr. Lenora Rodriguez. *  Please give a list of your current medications to your Primary Care Provider. *  Please update this list whenever your medications are discontinued, doses are      changed, or new medications (including over-the-counter products) are added.     *  Please carry medication information at all times in case of emergency situations. These are general instructions for a healthy lifestyle:    No smoking/ No tobacco products/ Avoid exposure to second hand smoke    Surgeon General's Warning:  Quitting smoking now greatly reduces serious risk to your health. Obesity, smoking, and sedentary lifestyle greatly increases your risk for illness    A healthy diet, regular physical exercise & weight monitoring are important for maintaining a healthy lifestyle    You may be retaining fluid if you have a history of heart failure or if you experience any of the following symptoms:  Weight gain of 3 pounds or more overnight or 5 pounds in a week, increased swelling in our hands or feet or shortness of breath while lying flat in bed. Please call your doctor as soon as you notice any of these symptoms; do not wait until your next office visit. Recognize signs and symptoms of STROKE:    F-face looks uneven    A-arms unable to move or move unevenly    S-speech slurred or non-existent    T-time-call 911 as soon as signs and symptoms begin-DO NOT go       Back to bed or wait to see if you get better-TIME IS BRAIN. Warning Signs of HEART ATTACK     Call 911 if you have these symptoms:   Chest discomfort. Most heart attacks involve discomfort in the center of the chest that lasts more than a few minutes, or that goes away and comes back. It can feel like uncomfortable pressure, squeezing, fullness, or pain.  Discomfort in other areas of the upper body. Symptoms can include pain or discomfort in one or both arms, the back, neck, jaw, or stomach.  Shortness of breath with or without chest discomfort.  Other signs may include breaking out in a cold sweat, nausea, or lightheadedness. Don't wait more than five minutes to call 911 - MINUTES MATTER! Fast action can save your life. Calling 911 is almost always the fastest way to get lifesaving treatment.  Emergency Medical Services staff can begin treatment when they arrive -- up to an hour sooner than if someone gets to the hospital by car. The discharge information has been reviewed with the patient and caregiver. The patient and caregiver verbalized understanding. Discharge medications reviewed with the patient and caregiver and appropriate educational materials and side effects teaching were provided.       Patient armband removed and shredded

## 2017-07-18 NOTE — PERIOP NOTES
TRANSFER - OUT REPORT:    Verbal report given to Malia Oquendo RN (name) on Ra Dunbar  being transferred to phase 2(unit) for routine post - op       Report consisted of patients Situation, Background, Assessment and   Recommendations(SBAR). Information from the following report(s) SBAR, Intake/Output and MAR was reviewed with the receiving nurse. Lines:   Peripheral IV 08/06/13 Right Antecubital (Active)       Peripheral IV 07/18/17 Left Hand (Active)   Site Assessment Clean, dry, & intact 7/18/2017  9:12 AM   Phlebitis Assessment 0 7/18/2017  9:12 AM   Infiltration Assessment 0 7/18/2017  9:12 AM   Dressing Status Clean, dry, & intact 7/18/2017  9:12 AM   Dressing Type Tape;Transparent 7/18/2017  9:12 AM   Hub Color/Line Status Blue; Infusing 7/18/2017  9:12 AM        Opportunity for questions and clarification was provided.       Patient transported with:   [employer]

## 2017-07-18 NOTE — PERIOP NOTES
Pt was given 0.5 mg of dilaudid at 0954 for a total of 1 mg dilaudid IV, nurse did not scan patient and med and pt has already left

## 2017-07-18 NOTE — IP AVS SNAPSHOT
Heidy Gandhi[address] [address] 
[phone] Patient: Darryl Wall MRN: [id number] :1940 You are allergic to the following Allergen Reactions Codeine Other (comments)  
 tinnitus and disorientation Penicillin G Other (comments) Yeast infection Recent Documentation Height Weight BMI OB Status Smoking Status 1.575 m 82.7 kg 33.35 kg/m2 Hysterectomy Never Smoker Emergency Contacts Name Discharge Info Relation Home Work [facility] DISCHARGE CAREGIVER [3] Son [22] [phone] [facility] DISCHARGE CAREGIVER [3] Other Relative [6] [phone] Shanna Hills DISCHARGE CAREGIVER [3] Child [2] [phone] [facility]. Child [2] [phone] About your hospitalization You were admitted on:  2017 [facility] You were discharged on:  2017 Unit phone number:  [phone] Why you were hospitalized Your primary diagnosis was:  Postoperative Stiffness Of Total Knee Replacement (Hcc) Providers Seen During Your Hospitalizations Provider Role Specialty Primary office phone Emilie Lua MD Attending Provider Orthopedic Surgery [phone] Your Primary Care Physician (PCP) Primary Care Physician Office Phone Office Fax 8181 [address] [place] [address] [place] [phone] Follow-up Information Follow up With Details Comments Contact Info Jocelyn Lujan MD   [address] 
[phone] Emilie Lua MD Follow up in 10 day(s)  [address] [facility] and [address] [place], [place] [id number] [phone] Current Discharge Medication List  
  
START taking these medications Dose & Instructions Dispensing Information Comments Morning Noon Evening Bedtime HYDROmorphone 2 mg tablet Commonly known as:  DILAUDID Your last dose was: Your next dose is:    
   
   
 Dose:  2-4 mg Take 1-2 Tabs by mouth every four (4) hours as needed for Pain. Max Daily Amount: 24 mg. Indications: has rx at home already. Quantity:  90 Tab Refills:  0 CONTINUE these medications which have NOT CHANGED Dose & Instructions Dispensing Information Comments Morning Noon Evening Bedtime CARAFATE 100 mg/mL suspension Generic drug:  sucralfate Your last dose was: Your next dose is:    
   
   
 Dose:  2 tsp Take 2 tsp by mouth two (2) times a day. Indications: Reflux, ulcer Refills:  0 Citracal + D tablet Generic drug:  calcium citrate-vitamin D3 Your last dose was: Your next dose is:    
   
   
 Dose:  1 Tab Take 1 Tab by mouth daily. Refills:  0  
     
   
   
   
  
 * COUMADIN 2.5 mg tablet Generic drug:  warfarin Your last dose was: Your next dose is:    
   
   
 Dose:  2.5 mg Take 2.5 mg by mouth four (4) days a week. Indications: deep venous thrombosis, Monday, Wednesday, Friday,, Sunday Refills:  0  
     
   
   
   
  
 * warfarin 5 mg tablet Commonly known as:  COUMADIN Start taking on:  7/19/2017 Your last dose was: Your next dose is:    
   
   
 Dose:  5 mg Take 1 Tab by mouth Q TU, TH & SAT. Indications: deep venous thrombosis Quantity:  2 Tab Refills:  0  
     
   
   
   
  
 FOSAMAX 70 mg tablet Generic drug:  alendronate Your last dose was: Your next dose is:    
   
   
 Dose:  70 mg Take 70 mg by mouth every Tuesday. Plus Vit D  Indications: POST-MENOPAUSAL OSTEOPOROSIS Refills:  0  
     
   
   
   
  
 gabapentin 100 mg capsule Commonly known as:  NEURONTIN Your last dose was:     
   
Your next dose is:    
   
   
 Dose:  100 mg  
 Take 100 mg by mouth three (3) times daily. Refills:  0  
     
   
   
   
  
 hydroCHLOROthiazide 12.5 mg tablet Commonly known as:  HYDRODIURIL Your last dose was: Your next dose is:    
   
   
 Dose:  12.5 mg Take 12.5 mg by mouth daily. Indications: Edema Refills:  0  
     
   
   
   
  
 isosorbide mononitrate ER 30 mg tablet Commonly known as:  IMDUR Your last dose was: Your next dose is:    
   
   
 Dose:  30 mg Take 30 mg by mouth daily. Refills:  0  
     
   
   
   
  
 magnesium oxide 400 mg tablet Commonly known as:  MAG-OX Your last dose was: Your next dose is:    
   
   
 Dose:  400 mg Take 400 mg by mouth two (2) times a day. Refills:  0  
     
   
   
   
  
 mirabegron ER 25 mg ER tablet Commonly known as:  MYRBETRIQ Your last dose was: Your next dose is:    
   
   
 Dose:  25 mg Take 1 Tab by mouth daily. Indications: URINARY URGE INCONTINENCE Quantity:  90 Tab Refills:  3 NexIUM 40 mg capsule Generic drug:  esomeprazole Your last dose was: Your next dose is:    
   
   
 Dose:  40 mg Take 40 mg by mouth daily. Refills:  0  
     
   
   
   
  
 potassium chloride 20 mEq tablet Commonly known as:  K-DUR, KLOR-CON Your last dose was: Your next dose is: Take  by mouth two (2) times a day. Refills:  0  
     
   
   
   
  
 RESTASIS 0.05 % ophthalmic emulsion Generic drug:  cycloSPORINE Your last dose was: Your next dose is:    
   
   
 Dose:  1 Drop Administer 1 Drop to both eyes two (2) times a day. Indications: DRY EYE Refills:  0  
     
   
   
   
  
 theophylline 0.05 % nasal spray Generic drug:  oxymetazoline Your last dose was: Your next dose is:    
   
   
 Dose:  2 Spray 2 Sprays two (2) times a day. Indications: Nasal Congestion Refills:  0 * Notice: This list has 2 medication(s) that are the same as other medications prescribed for you. Read the directions carefully, and ask your doctor or other care provider to review them with you. Where to Get Your Medications Information on where to get these meds will be given to you by the nurse or doctor. ! Ask your nurse or doctor about these medications HYDROmorphone 2 mg tablet  
 warfarin 5 mg tablet Discharge Instructions WBAT. Report to physical therapy today. Take stool softeners daily while taking pain medications, since pain medicines are constipating. DISCHARGE SUMMARY from Nurse The following personal items are in your possession at time of discharge: 
 
Dental Appliances: At home, Uppers, Partials, Lowers Visual Aid: Glasses, Sent home Home Medications: None Jewelry: None Clothing: Pants, Shirt, Undergarments, Footwear (locker 15) Other Valuables: Carly Phelan (locker room) PATIENT INSTRUCTIONS: 
 
After general anesthesia or intravenous sedation, for 24 hours or while taking prescription Narcotics: · Limit your activities · Do not drive and operate hazardous machinery · Do not make important personal or business decisions · Do  not drink alcoholic beverages · If you have not urinated within 8 hours after discharge, please contact your surgeon on call. Report the following to your surgeon: 
· Excessive pain, swelling, redness or odor of or around the surgical area · Temperature over 100.5 · Nausea and vomiting lasting longer than 4 hours or if unable to take medications · Any signs of decreased circulation or nerve impairment to extremity: change in color, persistent  numbness, tingling, coldness or increase pain · Any questions What to do at Home: 
Recommended activity: Activity as tolerated and no driving for today, Ambulate in house, No lifting, Driving, or Strenuous exercise until advised and No driving while on analgesics. If you experience any of the following symptoms: Fever, chills, uncontrolled pain, circulation changes, active bleeding or drainage, please follow up with Dr. Isha Couch. *  Please give a list of your current medications to your Primary Care Provider. *  Please update this list whenever your medications are discontinued, doses are 
    changed, or new medications (including over-the-counter products) are added. *  Please carry medication information at all times in case of emergency situations. These are general instructions for a healthy lifestyle: No smoking/ No tobacco products/ Avoid exposure to second hand smoke Surgeon General's Warning:  Quitting smoking now greatly reduces serious risk to your health. Obesity, smoking, and sedentary lifestyle greatly increases your risk for illness A healthy diet, regular physical exercise & weight monitoring are important for maintaining a healthy lifestyle You may be retaining fluid if you have a history of heart failure or if you experience any of the following symptoms:  Weight gain of 3 pounds or more overnight or 5 pounds in a week, increased swelling in our hands or feet or shortness of breath while lying flat in bed. Please call your doctor as soon as you notice any of these symptoms; do not wait until your next office visit. Recognize signs and symptoms of STROKE: 
 
F-face looks uneven A-arms unable to move or move unevenly S-speech slurred or non-existent T-time-call 911 as soon as signs and symptoms begin-DO NOT go Back to bed or wait to see if you get better-TIME IS BRAIN. Warning Signs of HEART ATTACK Call 911 if you have these symptoms: 
? Chest discomfort. Most heart attacks involve discomfort in the center of the chest that lasts more than a few minutes, or that goes away and comes back.  It can feel like uncomfortable pressure, squeezing, fullness, or pain. 
? Discomfort in other areas of the upper body. Symptoms can include pain or discomfort in one or both arms, the back, neck, jaw, or stomach. ? Shortness of breath with or without chest discomfort. ? Other signs may include breaking out in a cold sweat, nausea, or lightheadedness. Don't wait more than five minutes to call [address]! Fast action can save your life. Calling 911 is almost always the fastest way to get lifesaving treatment. Emergency Medical Services staff can begin treatment when they arrive  up to an hour sooner than if someone gets to the hospital by car. The discharge information has been reviewed with the patient and caregiver. The patient and caregiver verbalized understanding. Discharge medications reviewed with the patient and caregiver and appropriate educational materials and side effects teaching were provided. Patient armband removed and shredded Discharge Orders None Introducing [facility] SERVICES! [facility] introduces [employer] patient portal. Now you can access parts of your medical record, email your doctor's office, and request medication refills online. 1. In your internet browser, go to [email or web address] 2. Click on the First Time User? Click Here link in the Sign In box. You will see the New Member Sign Up page. 3. Enter your [employer] Access Code exactly as it appears below. You will not need to use this code after youve completed the sign-up process. If you do not sign up before the expiration date, you must request a new code. · [employer] Access Code: [email or web address] Expires: 10/12/2017 11:04 AM 
 
4. Enter the last four digits of your Social Security Number (xxxx) and Date of Birth (mm/dd/yyyy) as indicated and click Submit. You will be taken to the next sign-up page. 5. Create a [employer] ID.  This will be your [employer] login ID and cannot be changed, so think of one that is secure and easy to remember. 6. Create a [employer] password. You can change your password at any time. 7. Enter your Password Reset Question and Answer. This can be used at a later time if you forget your password. 8. Enter your e-mail address. You will receive e-mail notification when new information is available in [address] 9. Click Sign Up. You can now view and download portions of your medical record. 10. Click the Download Summary menu link to download a portable copy of your medical information. If you have questions, please visit the Frequently Asked Questions section of the [employer] website. Remember, [employer] is NOT to be used for urgent needs. For medical emergencies, dial 911. Now available from your iPhone and Android! General Information Please provide this summary of care documentation to your next provider. Patient Signature:  ____________________________________________________________ Date:  ____________________________________________________________  
  
Daniela Matthews Provider Signature:  ____________________________________________________________ Date:  ____________________________________________________________

## 2020-10-02 ENCOUNTER — HOSPITAL ENCOUNTER (OUTPATIENT)
Dept: LAB | Age: 80
Discharge: HOME | End: 2020-10-02

## 2020-10-02 LAB
BASOPHILS # BLD: 0 K/UL (ref 0–0.1)
BASOPHILS NFR BLD: 0 % (ref 0–2)
DIFFERENTIAL METHOD BLD: ABNORMAL
EOSINOPHIL # BLD: 0.1 K/UL (ref 0–0.4)
EOSINOPHIL NFR BLD: 1 % (ref 0–5)
ERYTHROCYTE [DISTWIDTH] IN BLOOD BY AUTOMATED COUNT: 16.1 % (ref 11.6–14.5)
HCT VFR BLD AUTO: 31.8 % (ref 35–45)
HGB BLD-MCNC: 9.7 G/DL (ref 12–16)
LYMPHOCYTES # BLD: 1.7 K/UL (ref 0.9–3.6)
LYMPHOCYTES NFR BLD: 28 % (ref 21–52)
MCH RBC QN AUTO: 30 PG (ref 24–34)
MCHC RBC AUTO-ENTMCNC: 30.5 G/DL (ref 31–37)
MCV RBC AUTO: 98.5 FL (ref 74–97)
MONOCYTES # BLD: 0.7 K/UL (ref 0.05–1.2)
MONOCYTES NFR BLD: 12 % (ref 3–10)
NEUTS SEG # BLD: 3.6 K/UL (ref 1.8–8)
NEUTS SEG NFR BLD: 59 % (ref 40–73)
PLATELET # BLD AUTO: 347 K/UL (ref 135–420)
PMV BLD AUTO: 9.4 FL (ref 9.2–11.8)
RBC # BLD AUTO: 3.23 M/UL (ref 4.2–5.3)
WBC # BLD AUTO: 6.1 K/UL (ref 4.6–13.2)

## 2020-10-02 PROCEDURE — 85025 COMPLETE CBC W/AUTO DIFF WBC: CPT

## 2020-10-04 LAB
FAX TO INFO,FAXT: NORMAL
FAX TO NUMBER,FAXN: NORMAL

## (undated) DEVICE — STERILE POLYISOPRENE POWDER-FREE SURGICAL GLOVES: Brand: PROTEXIS

## (undated) DEVICE — BASIC SINGLE BASIN 1-LF: Brand: MEDLINE INDUSTRIES, INC.

## (undated) DEVICE — DRAIN KT WND 10FR RND 400ML --

## (undated) DEVICE — PLUS HANDPIECE WITH SPRAY TIP: Brand: SURGILAV

## (undated) DEVICE — REM POLYHESIVE ADULT PATIENT RETURN ELECTRODE: Brand: VALLEYLAB

## (undated) DEVICE — TOTAL KNEE PACK-LF: Brand: MEDLINE INDUSTRIES, INC.

## (undated) DEVICE — PREP SKN PREVAIL 40ML APPL --

## (undated) DEVICE — SOL IRRIGATION INJ NACL 0.9% 500ML BTL

## (undated) DEVICE — SUTURE ABSORBABLE BRAIDED 1-0 OS-8 CR 3X18 IN UD VICRYL J757T

## (undated) DEVICE — CONCISE CEMENT SCULPS KIT: Brand: CONCISE

## (undated) DEVICE — KENDALL SCD EXPRESS FOOT CUFF, MEDIUM: Brand: KENDALL SCD

## (undated) DEVICE — DISPOSABLE TOURNIQUET CUFF SINGLE BLADDER, SINGLE PORT AND QUICK CONNECT CONNECTOR: Brand: COLOR CUFF

## (undated) DEVICE — VISIONAIRE LEFT CUTTING BLOCK KIT                                    - GENESIS II: Brand: VISIONAIRE

## (undated) DEVICE — DRESSING PETRO W3XL8IN N ADH OIL EMUL GZ CURAD

## (undated) DEVICE — DEVON™ KNEE AND BODY STRAP 60" X 3" (1.5 M X 7.6 CM): Brand: DEVON

## (undated) DEVICE — INTENDED FOR TISSUE SEPARATION, AND OTHER PROCEDURES THAT REQUIRE A SHARP SURGICAL BLADE TO PUNCTURE OR CUT.: Brand: BARD-PARKER ® CARBON RIB-BACK BLADES

## (undated) DEVICE — GENESIS TROCHLEAR PIN 1/8 X 3: Brand: GENESIS

## (undated) DEVICE — Device

## (undated) DEVICE — STERILE TETRA-FLEX CF LF, 6IN X 11 YD: Brand: TETRA-FLEX™ CF

## (undated) DEVICE — SOL IRR STRL H2O 1500ML BTL --

## (undated) DEVICE — IMMOBILIZER KNEE UNIV L19IN FOR 12-24IN THGH FOAM T BAR

## (undated) DEVICE — PAD,ABDOMINAL,5"X9",STERILE,LF,1/PK: Brand: MEDLINE INDUSTRIES, INC.

## (undated) DEVICE — SINGLE PORT MANIFOLD: Brand: NEPTUNE 2

## (undated) DEVICE — SPONGE GZ W4XL4IN COT 12 PLY TYP VII WVN C FLD DSGN

## (undated) DEVICE — STRYKER PERFORMANCE SERIES SAGITTAL BLADE: Brand: STRYKER PERFORMANCE SERIES

## (undated) DEVICE — SOL INJ L R 1000ML BG --

## (undated) DEVICE — BOWL AND CEMENT CARTRIDGE WITH BREAKAWAY FEMORAL NOZZLE: Brand: ACM

## (undated) DEVICE — SUTURE VCRL SZ 2-0 L18IN ABSRB VLT L26MM CT-2 1/2 CIR J726D

## (undated) DEVICE — GENESIS PIN AND DRILL SET: Brand: GENESIS